# Patient Record
Sex: MALE | Race: WHITE | NOT HISPANIC OR LATINO | Employment: FULL TIME | URBAN - METROPOLITAN AREA
[De-identification: names, ages, dates, MRNs, and addresses within clinical notes are randomized per-mention and may not be internally consistent; named-entity substitution may affect disease eponyms.]

---

## 2017-10-10 ENCOUNTER — HISTORICAL (OUTPATIENT)
Dept: ADMINISTRATIVE | Facility: HOSPITAL | Age: 47
End: 2017-10-10

## 2017-10-12 ENCOUNTER — HISTORICAL (OUTPATIENT)
Dept: RADIOLOGY | Facility: HOSPITAL | Age: 47
End: 2017-10-12

## 2021-01-22 ENCOUNTER — HISTORICAL (OUTPATIENT)
Dept: LAB | Facility: HOSPITAL | Age: 51
End: 2021-01-22

## 2021-01-22 LAB
ABS NEUT (OLG): 5.2 X10(3)/MCL (ref 1.5–6.9)
ALBUMIN SERPL-MCNC: 4.1 GM/DL (ref 3.5–5)
ALBUMIN/GLOB SERPL: 1.3 RATIO (ref 1.1–2)
ALP SERPL-CCNC: 51 UNIT/L (ref 40–150)
ALT SERPL-CCNC: 61 UNIT/L (ref 0–55)
APPEARANCE, UA: CLEAR
AST SERPL-CCNC: 38 UNIT/L (ref 5–34)
BASOPHILS # BLD AUTO: 0.1 X10(3)/MCL (ref 0–0.1)
BASOPHILS NFR BLD AUTO: 1 % (ref 0–1)
BILIRUB SERPL-MCNC: 0.5 MG/DL
BILIRUB UR QL STRIP: NEGATIVE
BILIRUBIN DIRECT+TOT PNL SERPL-MCNC: 0.2 MG/DL (ref 0–0.5)
BILIRUBIN DIRECT+TOT PNL SERPL-MCNC: 0.3 MG/DL (ref 0–0.8)
BUN SERPL-MCNC: 11 MG/DL (ref 8.4–25.7)
CALCIUM SERPL-MCNC: 9 MG/DL (ref 8.4–10.2)
CHLORIDE SERPL-SCNC: 101 MMOL/L (ref 98–107)
CHOLEST SERPL-MCNC: 228 MG/DL
CHOLEST/HDLC SERPL: 6 {RATIO} (ref 0–5)
CO2 SERPL-SCNC: 27 MMOL/L (ref 22–29)
COLOR UR: YELLOW
CREAT SERPL-MCNC: 0.89 MG/DL (ref 0.73–1.18)
EOSINOPHIL # BLD AUTO: 0.1 X10(3)/MCL (ref 0–0.6)
EOSINOPHIL NFR BLD AUTO: 2 % (ref 0–5)
ERYTHROCYTE [DISTWIDTH] IN BLOOD BY AUTOMATED COUNT: 12.4 % (ref 11.5–17)
EST. AVERAGE GLUCOSE BLD GHB EST-MCNC: 134.1 MG/DL
GLOBULIN SER-MCNC: 3.1 GM/DL (ref 2.4–3.5)
GLUCOSE (UA): NEGATIVE MG/DL
GLUCOSE SERPL-MCNC: 142 MG/DL (ref 74–100)
HBA1C MFR BLD: 6.3 %
HCT VFR BLD AUTO: 43.7 % (ref 42–52)
HCV AB SERPL QL IA: NONREACTIVE
HDLC SERPL-MCNC: 41 MG/DL (ref 35–60)
HGB BLD-MCNC: 14.9 GM/DL (ref 14–18)
HGB UR QL STRIP: NEGATIVE
IMM GRANULOCYTES # BLD AUTO: 0.05 10*3/UL (ref 0–0.02)
IMM GRANULOCYTES NFR BLD AUTO: 0.6 % (ref 0–0.43)
KETONES UR QL STRIP: NEGATIVE MG/DL
LDLC SERPL CALC-MCNC: 143 MG/DL (ref 50–140)
LEUKOCYTE ESTERASE UR QL STRIP: NEGATIVE
LYMPHOCYTES # BLD AUTO: 1.3 X10(3)/MCL (ref 0.5–4.1)
LYMPHOCYTES NFR BLD AUTO: 17 % (ref 15–40)
MCH RBC QN AUTO: 31 PG (ref 27–34)
MCHC RBC AUTO-ENTMCNC: 34 GM/DL (ref 31–36)
MCV RBC AUTO: 92 FL (ref 80–99)
MONOCYTES # BLD AUTO: 1.1 X10(3)/MCL (ref 0–1.1)
MONOCYTES NFR BLD AUTO: 14 % (ref 4–12)
NEUTROPHILS # BLD AUTO: 5.2 X10(3)/MCL (ref 1.5–6.9)
NEUTROPHILS NFR BLD AUTO: 66 % (ref 43–75)
NITRITE UR QL STRIP: NEGATIVE
PH UR STRIP: 6.5 [PH] (ref 4.6–8)
PLATELET # BLD AUTO: 235 X10(3)/MCL (ref 140–400)
PMV BLD AUTO: 9.4 FL (ref 6.8–10)
POTASSIUM SERPL-SCNC: 4.1 MMOL/L (ref 3.5–5.1)
PROT SERPL-MCNC: 7.2 GM/DL (ref 6.4–8.3)
PROT UR QL STRIP: NEGATIVE MG/DL
PSA SERPL-MCNC: 1.62 NG/ML
RBC # BLD AUTO: 4.76 X10(6)/MCL (ref 4.7–6.1)
SODIUM SERPL-SCNC: 136 MMOL/L (ref 136–145)
SP GR UR STRIP: 1.02 (ref 1–1.03)
TRIGL SERPL-MCNC: 221 MG/DL (ref 34–140)
TSH SERPL-ACNC: 1.36 UIU/ML (ref 0.35–4.94)
URATE SERPL-MCNC: 7.2 MG/DL (ref 3.5–7.2)
UROBILINOGEN UR STRIP-ACNC: 0.2 EU/DL
VLDLC SERPL CALC-MCNC: 44 MG/DL
WBC # SPEC AUTO: 7.9 X10(3)/MCL (ref 4.5–11.5)

## 2021-02-10 LAB — CRC RECOMMENDATION EXT: NORMAL

## 2021-08-10 ENCOUNTER — HISTORICAL (OUTPATIENT)
Dept: ADMINISTRATIVE | Facility: HOSPITAL | Age: 51
End: 2021-08-10

## 2022-02-07 ENCOUNTER — HISTORICAL (OUTPATIENT)
Dept: ADMINISTRATIVE | Facility: HOSPITAL | Age: 52
End: 2022-02-07

## 2022-02-07 LAB
ABS NEUT (OLG): 5.98 (ref 2.1–9.2)
ALBUMIN SERPL-MCNC: 4.6 G/DL (ref 3.5–5)
ALBUMIN/GLOB SERPL: 1.8 {RATIO} (ref 1.1–2)
ALP SERPL-CCNC: 47 U/L (ref 40–150)
ALT SERPL-CCNC: 26 U/L (ref 0–55)
APPEARANCE, UA: CLEAR
AST SERPL-CCNC: 20 U/L (ref 5–34)
BACTERIA SPEC CULT: NORMAL
BASOPHILS # BLD AUTO: 0 10*3/UL (ref 0–0.2)
BASOPHILS NFR BLD AUTO: 0 %
BILIRUB SERPL-MCNC: 0.6 MG/DL
BILIRUB UR QL STRIP: NEGATIVE
BILIRUBIN DIRECT+TOT PNL SERPL-MCNC: 0.3 (ref 0–0.5)
BILIRUBIN DIRECT+TOT PNL SERPL-MCNC: 0.3 (ref 0–0.8)
BUDDING YEAST: NORMAL
BUN SERPL-MCNC: 9 MG/DL (ref 8.4–25.7)
CALCIUM SERPL-MCNC: 10 MG/DL (ref 8.7–10.5)
CASTS, UA: NORMAL
CHLORIDE SERPL-SCNC: 105 MMOL/L (ref 98–107)
CHOLEST SERPL-MCNC: 219 MG/DL
CHOLEST/HDLC SERPL: 4 {RATIO} (ref 0–5)
CO2 SERPL-SCNC: 27 MMOL/L (ref 22–29)
COLOR UR: YELLOW
CREAT SERPL-MCNC: 0.79 MG/DL (ref 0.73–1.18)
CRYSTALS: NORMAL
EOSINOPHIL # BLD AUTO: 0 10*3/UL (ref 0–0.9)
EOSINOPHIL NFR BLD AUTO: 1 %
ERYTHROCYTE [DISTWIDTH] IN BLOOD BY AUTOMATED COUNT: 12.9 % (ref 11.5–17)
EST. AVERAGE GLUCOSE BLD GHB EST-MCNC: 108.3 MG/DL
GLOBULIN SER-MCNC: 2.6 G/DL (ref 2.4–3.5)
GLUCOSE (UA): NEGATIVE
GLUCOSE SERPL-MCNC: 110 MG/DL (ref 74–100)
HBA1C MFR BLD: 5.4 %
HCT VFR BLD AUTO: 48 % (ref 42–52)
HDLC SERPL-MCNC: 60 MG/DL (ref 35–60)
HEMOLYSIS INTERF INDEX SERPL-ACNC: 12
HGB BLD-MCNC: 15.1 G/DL (ref 14–18)
HGB UR QL STRIP: NEGATIVE
ICTERIC INTERF INDEX SERPL-ACNC: 1
KETONES UR QL STRIP: NEGATIVE
LDLC SERPL CALC-MCNC: 146 MG/DL (ref 50–140)
LEUKOCYTE ESTERASE UR QL STRIP: NORMAL
LIPEMIC INTERF INDEX SERPL-ACNC: 13
LYMPHOCYTES # BLD AUTO: 1.4 10*3/UL (ref 0.6–4.6)
LYMPHOCYTES NFR BLD AUTO: 18 %
MANUAL DIFF? (OHS): NO
MCH RBC QN AUTO: 31.9 PG (ref 27–31)
MCHC RBC AUTO-ENTMCNC: 31.5 G/DL (ref 33–36)
MCV RBC AUTO: 101.5 FL (ref 80–94)
MONOCYTES # BLD AUTO: 0.6 10*3/UL (ref 0.1–1.3)
MONOCYTES NFR BLD AUTO: 7 %
NEUTROPHILS # BLD AUTO: 5.98 10*3/UL (ref 2.1–9.2)
NEUTROPHILS NFR BLD AUTO: 73 %
NITRITE UR QL STRIP: NEGATIVE
PH UR STRIP: 7 [PH] (ref 5–9)
PLATELET # BLD AUTO: 221 10*3/UL (ref 130–400)
PMV BLD AUTO: 10 FL (ref 9.4–12.4)
POTASSIUM SERPL-SCNC: 4.5 MMOL/L (ref 3.5–5.1)
PROT SERPL-MCNC: 7.2 G/DL (ref 6.4–8.3)
PROT UR QL STRIP: NEGATIVE
PSA SERPL-MCNC: 2.07 NG/ML
RBC # BLD AUTO: 4.73 10*6/UL (ref 4.7–6.1)
RBC #/AREA URNS HPF: NORMAL /[HPF] (ref 0–2)
SMALL ROUND CELLS, UA: NORMAL
SODIUM SERPL-SCNC: 144 MMOL/L (ref 136–145)
SP GR UR STRIP: 1 (ref 1–1.03)
SPERM URNS QL MICRO: NORMAL
SQUAMOUS EPITHELIAL, UA: NORMAL (ref 0–4)
TRIGL SERPL-MCNC: 66 MG/DL (ref 34–140)
TSH SERPL-ACNC: 0.76 M[IU]/L (ref 0.35–4.94)
UROBILINOGEN UR STRIP-ACNC: 0.2
VLDLC SERPL CALC-MCNC: 13 MG/DL
WBC # SPEC AUTO: 8.2 10*3/UL (ref 4.5–11.5)
WBC #/AREA URNS HPF: NORMAL /[HPF] (ref 0–2)

## 2022-04-09 ENCOUNTER — HISTORICAL (OUTPATIENT)
Dept: ADMINISTRATIVE | Facility: HOSPITAL | Age: 52
End: 2022-04-09
Payer: COMMERCIAL

## 2022-04-27 VITALS
BODY MASS INDEX: 35.82 KG/M2 | SYSTOLIC BLOOD PRESSURE: 166 MMHG | DIASTOLIC BLOOD PRESSURE: 84 MMHG | WEIGHT: 241.88 LBS | HEIGHT: 69 IN

## 2022-09-05 NOTE — PROGRESS NOTES
Patient ID: [id number]     Chief Complaint: Follow Up        HPI:     Javi Rain is a 52 y.o. male here today for a HTN follow up. No other complaints today. Feels great. Denies of SOB, CP,HA, Lower ext swelling or dizziness. No new complaints. Compliant with Losartan and Allopurinol daily. BP mildly elevated as he reports of using smokeless tobacco. Repeat BP at goal.     ----------------------------  Gout  Hyperlipidemia  Hypertension  Hypertension  Hyperuricemia  Obesity  Osteoarthritis of left hip  Prediabetes     History reviewed. No pertinent surgical history.    Review of patient's allergies indicates:  No Known Allergies    Outpatient Medications Marked as Taking for the 9/7/22 encounter (Office Visit) with Erica Adams MD   Medication Sig Dispense Refill    allopurinoL (ZYLOPRIM) 100 MG tablet Take 100 mg by mouth once daily.      [DISCONTINUED] losartan (COZAAR) 25 MG tablet Take 25 mg by mouth once daily.         Social History     Socioeconomic History    Marital status:    Tobacco Use    Smoking status: Every Day    Tobacco comments:     Smokless   Substance and Sexual Activity    Alcohol use: Yes     Alcohol/week: 14.0 standard drinks     Types: 14 Cans of beer per week    Drug use: Never    Sexual activity: Yes     Partners: Female     Birth control/protection: None        Family History   Problem Relation Age of Onset    Arthritis Mother     Heart disease Maternal Grandfather     Diabetes Maternal Grandmother     Diabetes Paternal Grandmother         Patient Care Team:  Erica Adams MD as PCP - General (Family Medicine)     Subjective:     ROS    See HPI for details    Constitutional: Denies Change in appetite. Denies Chills. Denies Fever. Denies Night sweats.  Eye: Denies Blurred vision. Denies Discharge. Denies Eye pain.  ENT: Denies Decreased hearing. Denies Sore throat. Denies Swollen glands.  Respiratory: Denies Cough. Denies Shortness of breath. Denies Shortness of breath  "with exertion. Denies Wheezing.  Cardiovascular: DeniesChest pain at rest. Denies Chest pain with exertion. Denies Irregular heartbeat. Denies Palpitations. Denies Edema.  Gastrointestinal: Denies Abdominal pain. DeniesDiarrhea. Denies Nausea. Denies Vomiting. Denies Hematemesis or Hematochezia.  Genitourinary: Denies Dysuria. Denies Urinary frequency. Denies Urinary urgency. Denies Blood in urine.  Endocrine: Denies Cold intolerance. Denies Excessive thirst. Denies Heat intolerance. Denies Weight loss. Denies Weight gain.  Musculoskeletal: Denies Painful joints. Denies Weakness.  Integumentary: Denies Rash. Denies Itching. Denies Dry skin.  Neurologic: Denies Dizziness. Denies Fainting. Denies Headache.  Psychiatric: Denies Depression. Denies Anxiety. Denies Suicidal/Homicidal ideations.    All Other ROS: Negative except as stated in HPI.       Objective:     BP (!) 142/89 (BP Location: Right arm, Patient Position: Sitting, BP Method: Large (Manual))   Pulse 65   Temp 97.9 °F (36.6 °C) (Oral)   Resp 17   Ht 5' 8" (1.727 m)   Wt 86.7 kg (191 lb 3.2 oz)   SpO2 99%   BMI 29.07 kg/m²     Physical Exam    General: Alert and oriented, No acute distress.  Head: Normocephalic, Atraumatic.  Eye: Pupils are equal, round and reactive to light, Extraocular movements are intact, Sclera non-icteric.  Ears/Nose/Throat: Normal, Mucosa moist,Clear.  Neck/Thyroid: Supple, Non-tender, No carotid bruit, No palpable thyromegaly or thyroid nodule, No lymphadenopathy, No JVD, Full range of motion.  Respiratory: Clear to auscultation bilaterally; No wheezes, rales or rhonchi,Non-labored respirations, Symmetrical chest wall expansion.  Cardiovascular: Regular rate and rhythm, S1/S2 normal, No murmurs, rubs or gallops.      Assessment:       ICD-10-CM ICD-9-CM   1. Primary hypertension  I10 401.9   2. Hyperuricemia  E79.0 790.6        Plan:     1. Primary hypertension  BP elevated today in clinic. Repeat BP was at goal  Recommend to " change the BP cuff and be seen in for nurse visit  Continue current regimen  Low Sodium Diet (DASH Diet - Less than 2 grams of sodium per day).  Monitor blood pressure daily and log. Report consistent numbers greater than 140/90.  Maintain healthy weight with goal BMI <30. Exercise 30 minutes per day, 5 days per week.  Recommend to stop using tobacco products   The 10-year ASCVD risk score (Jin ALEGRIA, et al., 2019) is: 11.4%*    Values used to calculate the score:      Age: 52 years      Sex: Male      Is Non- : No      Diabetic: No      Tobacco smoker: Yes      Systolic Blood Pressure: 142 mmHg      Is BP treated: Yes      HDL Cholesterol: 60 mg/dL*      Total Cholesterol: 219 mg/dL*      * - Cholesterol units were assumed for this score calculation   Declines addition of statin therapy at this time.     2. Hyperuricemia  Continue current regimen  No new gout attacks  Stay well hydrated by increasing water intake throughout the day.  Stressed importance of exercise and weight loss to maintain BMI <30.  Avoid alcohol, sodas, organ/glandular meats (liver, kidney, sweetbreads). Limit seafood and red meat intake.  Eat a balanced diet of fruits, vegetables, complex carbohydrates, and lean sources of protein (boneless/skinless chicken breasts, salmon, lentils, low fat dairy).  Discussed possible benefit of OTC Vitamin C supplementation.       Follow up in about 6 months (around 3/7/2023) for Annual Wellness. In addition to their scheduled follow up, the patient has also been instructed to follow up on as needed basis.

## 2022-09-07 ENCOUNTER — OFFICE VISIT (OUTPATIENT)
Dept: FAMILY MEDICINE | Facility: CLINIC | Age: 52
End: 2022-09-07
Payer: COMMERCIAL

## 2022-09-07 VITALS
RESPIRATION RATE: 17 BRPM | HEIGHT: 68 IN | SYSTOLIC BLOOD PRESSURE: 142 MMHG | TEMPERATURE: 98 F | BODY MASS INDEX: 28.98 KG/M2 | WEIGHT: 191.19 LBS | DIASTOLIC BLOOD PRESSURE: 89 MMHG | OXYGEN SATURATION: 99 % | HEART RATE: 65 BPM

## 2022-09-07 DIAGNOSIS — I10 PRIMARY HYPERTENSION: Primary | Chronic | ICD-10-CM

## 2022-09-07 DIAGNOSIS — E79.0 HYPERURICEMIA: Chronic | ICD-10-CM

## 2022-09-07 PROBLEM — E78.5 HYPERLIPIDEMIA: Status: ACTIVE | Noted: 2022-09-07

## 2022-09-07 PROBLEM — M16.12 OSTEOARTHRITIS OF LEFT HIP: Chronic | Status: ACTIVE | Noted: 2022-09-07

## 2022-09-07 PROBLEM — R73.03 PREDIABETES: Status: ACTIVE | Noted: 2022-09-07

## 2022-09-07 PROBLEM — R73.03 PREDIABETES: Chronic | Status: ACTIVE | Noted: 2022-09-07

## 2022-09-07 PROBLEM — M10.9 GOUT: Status: ACTIVE | Noted: 2022-09-07

## 2022-09-07 PROBLEM — E66.9 OBESITY: Chronic | Status: ACTIVE | Noted: 2022-09-07

## 2022-09-07 PROBLEM — M10.9 GOUT: Chronic | Status: ACTIVE | Noted: 2022-09-07

## 2022-09-07 PROBLEM — E78.5 HYPERLIPIDEMIA: Chronic | Status: ACTIVE | Noted: 2022-09-07

## 2022-09-07 PROBLEM — E66.9 OBESITY: Status: ACTIVE | Noted: 2022-09-07

## 2022-09-07 PROBLEM — M16.12 OSTEOARTHRITIS OF LEFT HIP: Status: ACTIVE | Noted: 2022-09-07

## 2022-09-07 PROCEDURE — 3077F SYST BP >= 140 MM HG: CPT | Mod: CPTII,,, | Performed by: STUDENT IN AN ORGANIZED HEALTH CARE EDUCATION/TRAINING PROGRAM

## 2022-09-07 PROCEDURE — 3008F PR BODY MASS INDEX (BMI) DOCUMENTED: ICD-10-PCS | Mod: CPTII,,, | Performed by: STUDENT IN AN ORGANIZED HEALTH CARE EDUCATION/TRAINING PROGRAM

## 2022-09-07 PROCEDURE — 99213 PR OFFICE/OUTPT VISIT, EST, LEVL III, 20-29 MIN: ICD-10-PCS | Mod: ,,, | Performed by: STUDENT IN AN ORGANIZED HEALTH CARE EDUCATION/TRAINING PROGRAM

## 2022-09-07 PROCEDURE — 99213 OFFICE O/P EST LOW 20 MIN: CPT | Mod: ,,, | Performed by: STUDENT IN AN ORGANIZED HEALTH CARE EDUCATION/TRAINING PROGRAM

## 2022-09-07 PROCEDURE — 3077F PR MOST RECENT SYSTOLIC BLOOD PRESSURE >= 140 MM HG: ICD-10-PCS | Mod: CPTII,,, | Performed by: STUDENT IN AN ORGANIZED HEALTH CARE EDUCATION/TRAINING PROGRAM

## 2022-09-07 PROCEDURE — 4010F PR ACE/ARB THEARPY RXD/TAKEN: ICD-10-PCS | Mod: CPTII,,, | Performed by: STUDENT IN AN ORGANIZED HEALTH CARE EDUCATION/TRAINING PROGRAM

## 2022-09-07 PROCEDURE — 3008F BODY MASS INDEX DOCD: CPT | Mod: CPTII,,, | Performed by: STUDENT IN AN ORGANIZED HEALTH CARE EDUCATION/TRAINING PROGRAM

## 2022-09-07 PROCEDURE — 4010F ACE/ARB THERAPY RXD/TAKEN: CPT | Mod: CPTII,,, | Performed by: STUDENT IN AN ORGANIZED HEALTH CARE EDUCATION/TRAINING PROGRAM

## 2022-09-07 PROCEDURE — 1160F RVW MEDS BY RX/DR IN RCRD: CPT | Mod: CPTII,,, | Performed by: STUDENT IN AN ORGANIZED HEALTH CARE EDUCATION/TRAINING PROGRAM

## 2022-09-07 PROCEDURE — 3079F DIAST BP 80-89 MM HG: CPT | Mod: CPTII,,, | Performed by: STUDENT IN AN ORGANIZED HEALTH CARE EDUCATION/TRAINING PROGRAM

## 2022-09-07 PROCEDURE — 1160F PR REVIEW ALL MEDS BY PRESCRIBER/CLIN PHARMACIST DOCUMENTED: ICD-10-PCS | Mod: CPTII,,, | Performed by: STUDENT IN AN ORGANIZED HEALTH CARE EDUCATION/TRAINING PROGRAM

## 2022-09-07 PROCEDURE — 1159F PR MEDICATION LIST DOCUMENTED IN MEDICAL RECORD: ICD-10-PCS | Mod: CPTII,,, | Performed by: STUDENT IN AN ORGANIZED HEALTH CARE EDUCATION/TRAINING PROGRAM

## 2022-09-07 PROCEDURE — 1159F MED LIST DOCD IN RCRD: CPT | Mod: CPTII,,, | Performed by: STUDENT IN AN ORGANIZED HEALTH CARE EDUCATION/TRAINING PROGRAM

## 2022-09-07 PROCEDURE — 3079F PR MOST RECENT DIASTOLIC BLOOD PRESSURE 80-89 MM HG: ICD-10-PCS | Mod: CPTII,,, | Performed by: STUDENT IN AN ORGANIZED HEALTH CARE EDUCATION/TRAINING PROGRAM

## 2022-09-07 RX ORDER — LOSARTAN POTASSIUM 25 MG/1
25 TABLET ORAL DAILY
Qty: 90 TABLET | Refills: 3 | Status: SHIPPED | OUTPATIENT
Start: 2022-09-07 | End: 2023-09-05 | Stop reason: SDUPTHER

## 2022-09-07 RX ORDER — LOSARTAN POTASSIUM 25 MG/1
25 TABLET ORAL DAILY
COMMUNITY
Start: 2022-02-03 | End: 2022-09-07 | Stop reason: SDUPTHER

## 2022-09-07 RX ORDER — ALLOPURINOL 100 MG/1
100 TABLET ORAL DAILY
COMMUNITY
Start: 2022-08-11 | End: 2023-05-02 | Stop reason: SDUPTHER

## 2023-04-10 RX ORDER — ALLOPURINOL 100 MG/1
100 TABLET ORAL DAILY
Qty: 90 TABLET | Refills: 3 | OUTPATIENT
Start: 2023-04-10

## 2023-04-10 NOTE — TELEPHONE ENCOUNTER
----- Message from Ayanna Bains sent at 4/10/2023  4:04 PM CDT -----  Regarding: Med refill  [facility] @ [address] [facility]  is requesting a refill on Allopurinol 100mg tablets. Qty.90. Take 2 tablet by mouth daily.

## 2023-04-12 ENCOUNTER — OFFICE VISIT (OUTPATIENT)
Dept: FAMILY MEDICINE | Facility: CLINIC | Age: 53
End: 2023-04-12
Payer: COMMERCIAL

## 2023-04-12 ENCOUNTER — LAB VISIT (OUTPATIENT)
Dept: LAB | Facility: HOSPITAL | Age: 53
End: 2023-04-12
Attending: STUDENT IN AN ORGANIZED HEALTH CARE EDUCATION/TRAINING PROGRAM
Payer: COMMERCIAL

## 2023-04-12 VITALS
HEIGHT: 68 IN | TEMPERATURE: 98 F | SYSTOLIC BLOOD PRESSURE: 138 MMHG | HEART RATE: 73 BPM | DIASTOLIC BLOOD PRESSURE: 88 MMHG | RESPIRATION RATE: 16 BRPM | OXYGEN SATURATION: 99 % | WEIGHT: 197.88 LBS | BODY MASS INDEX: 29.99 KG/M2

## 2023-04-12 DIAGNOSIS — R73.03 PREDIABETES: Chronic | ICD-10-CM

## 2023-04-12 DIAGNOSIS — E78.2 MIXED HYPERLIPIDEMIA: Chronic | ICD-10-CM

## 2023-04-12 DIAGNOSIS — E79.0 HYPERURICEMIA: Chronic | ICD-10-CM

## 2023-04-12 DIAGNOSIS — Z11.4 ENCOUNTER FOR SCREENING FOR HIV: ICD-10-CM

## 2023-04-12 DIAGNOSIS — L30.9 ECZEMA, UNSPECIFIED TYPE: ICD-10-CM

## 2023-04-12 DIAGNOSIS — Z12.5 ENCOUNTER FOR SCREENING FOR MALIGNANT NEOPLASM OF PROSTATE: ICD-10-CM

## 2023-04-12 DIAGNOSIS — Z72.0 SMOKELESS TOBACCO USE: ICD-10-CM

## 2023-04-12 DIAGNOSIS — Z00.00 WELLNESS EXAMINATION: Primary | ICD-10-CM

## 2023-04-12 DIAGNOSIS — Z23 NEED FOR TETANUS, DIPHTHERIA, AND ACELLULAR PERTUSSIS (TDAP) VACCINE: ICD-10-CM

## 2023-04-12 DIAGNOSIS — I10 PRIMARY HYPERTENSION: ICD-10-CM

## 2023-04-12 DIAGNOSIS — Z23 NEED FOR SHINGLES VACCINE: ICD-10-CM

## 2023-04-12 PROBLEM — M10.9 GOUT: Chronic | Status: RESOLVED | Noted: 2022-09-07 | Resolved: 2023-04-12

## 2023-04-12 LAB
ALBUMIN SERPL-MCNC: 4.2 G/DL (ref 3.5–5)
ALBUMIN/GLOB SERPL: 1.8 RATIO (ref 1.1–2)
ALP SERPL-CCNC: 39 UNIT/L (ref 40–150)
ALT SERPL-CCNC: 23 UNIT/L (ref 0–55)
APPEARANCE UR: CLEAR
AST SERPL-CCNC: 22 UNIT/L (ref 5–34)
BACTERIA #/AREA URNS AUTO: NORMAL /HPF
BASOPHILS # BLD AUTO: 0.05 X10(3)/MCL (ref 0–0.2)
BASOPHILS NFR BLD AUTO: 0.6 %
BILIRUB UR QL STRIP.AUTO: NEGATIVE MG/DL
BILIRUBIN DIRECT+TOT PNL SERPL-MCNC: 0.5 MG/DL
BUN SERPL-MCNC: 10.9 MG/DL (ref 8.4–25.7)
CALCIUM SERPL-MCNC: 9.2 MG/DL (ref 8.4–10.2)
CHLORIDE SERPL-SCNC: 110 MMOL/L (ref 98–107)
CHOLEST SERPL-MCNC: 212 MG/DL
CHOLEST/HDLC SERPL: 3 {RATIO} (ref 0–5)
CO2 SERPL-SCNC: 27 MMOL/L (ref 22–29)
COLOR UR AUTO: YELLOW
CREAT SERPL-MCNC: 0.9 MG/DL (ref 0.73–1.18)
CREAT UR-MCNC: 35.4 MG/DL (ref 63–166)
EOSINOPHIL # BLD AUTO: 0.09 X10(3)/MCL (ref 0–0.9)
EOSINOPHIL NFR BLD AUTO: 1 %
ERYTHROCYTE [DISTWIDTH] IN BLOOD BY AUTOMATED COUNT: 13.2 % (ref 11.5–17)
EST. AVERAGE GLUCOSE BLD GHB EST-MCNC: 102.5 MG/DL
GFR SERPLBLD CREATININE-BSD FMLA CKD-EPI: >60 MLS/MIN/1.73/M2
GLOBULIN SER-MCNC: 2.4 GM/DL (ref 2.4–3.5)
GLUCOSE SERPL-MCNC: 96 MG/DL (ref 74–100)
GLUCOSE UR QL STRIP.AUTO: NEGATIVE MG/DL
HBA1C MFR BLD: 5.2 %
HCT VFR BLD AUTO: 41.8 % (ref 42–52)
HDLC SERPL-MCNC: 74 MG/DL (ref 35–60)
HGB BLD-MCNC: 13.7 G/DL (ref 14–18)
HIV 1+2 AB+HIV1 P24 AG SERPL QL IA: NONREACTIVE
IMM GRANULOCYTES # BLD AUTO: 0.04 X10(3)/MCL (ref 0–0.04)
IMM GRANULOCYTES NFR BLD AUTO: 0.5 %
KETONES UR QL STRIP.AUTO: NEGATIVE MG/DL
LDLC SERPL CALC-MCNC: 128 MG/DL (ref 50–140)
LEUKOCYTE ESTERASE UR QL STRIP.AUTO: ABNORMAL UNIT/L
LYMPHOCYTES # BLD AUTO: 1.47 X10(3)/MCL (ref 0.6–4.6)
LYMPHOCYTES NFR BLD AUTO: 17.1 %
MCH RBC QN AUTO: 32.2 PG (ref 27–31)
MCHC RBC AUTO-ENTMCNC: 32.8 G/DL (ref 33–36)
MCV RBC AUTO: 98.4 FL (ref 80–94)
MICROALBUMIN UR-MCNC: <5 UG/ML
MICROALBUMIN/CREAT RATIO PNL UR: <14.1 MG/GM CR (ref 0–30)
MONOCYTES # BLD AUTO: 0.67 X10(3)/MCL (ref 0.1–1.3)
MONOCYTES NFR BLD AUTO: 7.8 %
NEUTROPHILS # BLD AUTO: 6.27 X10(3)/MCL (ref 2.1–9.2)
NEUTROPHILS NFR BLD AUTO: 73 %
NITRITE UR QL STRIP.AUTO: NEGATIVE
NRBC BLD AUTO-RTO: 0 %
PH UR STRIP.AUTO: 6 [PH]
PLATELET # BLD AUTO: 229 X10(3)/MCL (ref 130–400)
PMV BLD AUTO: 9.5 FL (ref 7.4–10.4)
POTASSIUM SERPL-SCNC: 4.2 MMOL/L (ref 3.5–5.1)
PROT SERPL-MCNC: 6.6 GM/DL (ref 6.4–8.3)
PROT UR QL STRIP.AUTO: NEGATIVE MG/DL
PSA SERPL-MCNC: 2.83 NG/ML
RBC # BLD AUTO: 4.25 X10(6)/MCL (ref 4.7–6.1)
RBC #/AREA URNS AUTO: <5 /HPF
RBC UR QL AUTO: NEGATIVE UNIT/L
SODIUM SERPL-SCNC: 141 MMOL/L (ref 136–145)
SP GR UR STRIP.AUTO: 1.01 (ref 1–1.03)
SQUAMOUS #/AREA URNS AUTO: <5 /HPF
TRIGL SERPL-MCNC: 49 MG/DL (ref 34–140)
TSH SERPL-ACNC: 0.72 UIU/ML (ref 0.35–4.94)
URATE SERPL-MCNC: 6.4 MG/DL (ref 3.5–7.2)
UROBILINOGEN UR STRIP-ACNC: 0.2 MG/DL
VLDLC SERPL CALC-MCNC: 10 MG/DL
WBC # SPEC AUTO: 8.6 X10(3)/MCL (ref 4.5–11.5)
WBC #/AREA URNS AUTO: <5 /HPF

## 2023-04-12 PROCEDURE — 36415 COLL VENOUS BLD VENIPUNCTURE: CPT

## 2023-04-12 PROCEDURE — 3075F SYST BP GE 130 - 139MM HG: CPT | Mod: CPTII,,, | Performed by: STUDENT IN AN ORGANIZED HEALTH CARE EDUCATION/TRAINING PROGRAM

## 2023-04-12 PROCEDURE — 82043 UR ALBUMIN QUANTITATIVE: CPT

## 2023-04-12 PROCEDURE — 99406 PR TOBACCO USE CESSATION INTERMEDIATE 3-10 MINUTES: ICD-10-PCS | Mod: ,,, | Performed by: STUDENT IN AN ORGANIZED HEALTH CARE EDUCATION/TRAINING PROGRAM

## 2023-04-12 PROCEDURE — 84550 ASSAY OF BLOOD/URIC ACID: CPT

## 2023-04-12 PROCEDURE — 83036 HEMOGLOBIN GLYCOSYLATED A1C: CPT

## 2023-04-12 PROCEDURE — 3079F DIAST BP 80-89 MM HG: CPT | Mod: CPTII,,, | Performed by: STUDENT IN AN ORGANIZED HEALTH CARE EDUCATION/TRAINING PROGRAM

## 2023-04-12 PROCEDURE — 84153 ASSAY OF PSA TOTAL: CPT

## 2023-04-12 PROCEDURE — 99406 BEHAV CHNG SMOKING 3-10 MIN: CPT | Mod: ,,, | Performed by: STUDENT IN AN ORGANIZED HEALTH CARE EDUCATION/TRAINING PROGRAM

## 2023-04-12 PROCEDURE — 84443 ASSAY THYROID STIM HORMONE: CPT

## 2023-04-12 PROCEDURE — 4010F PR ACE/ARB THEARPY RXD/TAKEN: ICD-10-PCS | Mod: CPTII,,, | Performed by: STUDENT IN AN ORGANIZED HEALTH CARE EDUCATION/TRAINING PROGRAM

## 2023-04-12 PROCEDURE — 99396 PREV VISIT EST AGE 40-64: CPT | Mod: 25,,, | Performed by: STUDENT IN AN ORGANIZED HEALTH CARE EDUCATION/TRAINING PROGRAM

## 2023-04-12 PROCEDURE — 3066F PR DOCUMENTATION OF TREATMENT FOR NEPHROPATHY: ICD-10-PCS | Mod: CPTII,,, | Performed by: STUDENT IN AN ORGANIZED HEALTH CARE EDUCATION/TRAINING PROGRAM

## 2023-04-12 PROCEDURE — 3066F NEPHROPATHY DOC TX: CPT | Mod: CPTII,,, | Performed by: STUDENT IN AN ORGANIZED HEALTH CARE EDUCATION/TRAINING PROGRAM

## 2023-04-12 PROCEDURE — 1160F RVW MEDS BY RX/DR IN RCRD: CPT | Mod: CPTII,,, | Performed by: STUDENT IN AN ORGANIZED HEALTH CARE EDUCATION/TRAINING PROGRAM

## 2023-04-12 PROCEDURE — 80061 LIPID PANEL: CPT

## 2023-04-12 PROCEDURE — 4010F ACE/ARB THERAPY RXD/TAKEN: CPT | Mod: CPTII,,, | Performed by: STUDENT IN AN ORGANIZED HEALTH CARE EDUCATION/TRAINING PROGRAM

## 2023-04-12 PROCEDURE — 1159F MED LIST DOCD IN RCRD: CPT | Mod: CPTII,,, | Performed by: STUDENT IN AN ORGANIZED HEALTH CARE EDUCATION/TRAINING PROGRAM

## 2023-04-12 PROCEDURE — 80053 COMPREHEN METABOLIC PANEL: CPT

## 2023-04-12 PROCEDURE — 99396 PR PREVENTIVE VISIT,EST,40-64: ICD-10-PCS | Mod: 25,,, | Performed by: STUDENT IN AN ORGANIZED HEALTH CARE EDUCATION/TRAINING PROGRAM

## 2023-04-12 PROCEDURE — 3079F PR MOST RECENT DIASTOLIC BLOOD PRESSURE 80-89 MM HG: ICD-10-PCS | Mod: CPTII,,, | Performed by: STUDENT IN AN ORGANIZED HEALTH CARE EDUCATION/TRAINING PROGRAM

## 2023-04-12 PROCEDURE — 3075F PR MOST RECENT SYSTOLIC BLOOD PRESS GE 130-139MM HG: ICD-10-PCS | Mod: CPTII,,, | Performed by: STUDENT IN AN ORGANIZED HEALTH CARE EDUCATION/TRAINING PROGRAM

## 2023-04-12 PROCEDURE — 87389 HIV-1 AG W/HIV-1&-2 AB AG IA: CPT

## 2023-04-12 PROCEDURE — 1160F PR REVIEW ALL MEDS BY PRESCRIBER/CLIN PHARMACIST DOCUMENTED: ICD-10-PCS | Mod: CPTII,,, | Performed by: STUDENT IN AN ORGANIZED HEALTH CARE EDUCATION/TRAINING PROGRAM

## 2023-04-12 PROCEDURE — 3061F NEG MICROALBUMINURIA REV: CPT | Mod: CPTII,,, | Performed by: STUDENT IN AN ORGANIZED HEALTH CARE EDUCATION/TRAINING PROGRAM

## 2023-04-12 PROCEDURE — 1159F PR MEDICATION LIST DOCUMENTED IN MEDICAL RECORD: ICD-10-PCS | Mod: CPTII,,, | Performed by: STUDENT IN AN ORGANIZED HEALTH CARE EDUCATION/TRAINING PROGRAM

## 2023-04-12 PROCEDURE — 85025 COMPLETE CBC W/AUTO DIFF WBC: CPT

## 2023-04-12 PROCEDURE — 3061F PR NEG MICROALBUMINURIA RESULT DOCUMENTED/REVIEW: ICD-10-PCS | Mod: CPTII,,, | Performed by: STUDENT IN AN ORGANIZED HEALTH CARE EDUCATION/TRAINING PROGRAM

## 2023-04-12 RX ORDER — ZOSTER VACCINE RECOMBINANT, ADJUVANTED 50 MCG/0.5
0.5 KIT INTRAMUSCULAR ONCE
Qty: 1 EACH | Refills: 1 | Status: SHIPPED | OUTPATIENT
Start: 2023-04-12 | End: 2023-04-12

## 2023-04-12 RX ORDER — TRIAMCINOLONE ACETONIDE 1 MG/G
CREAM TOPICAL 2 TIMES DAILY
Qty: 30 G | Refills: 0 | Status: SHIPPED | OUTPATIENT
Start: 2023-04-12

## 2023-04-12 NOTE — PROGRESS NOTES
Patient ID: [id number]     Chief Complaint: Wellness        HPI:      Disclaimer:  This note is prepared using voice recognition software and as such is likely to have errors despite attempts at proofreading. Please contact me for questions.     Javi Rain is a 52 y.o. male here today for an annual wellness. Reviewed and discussed lab results. He did not have bloodwork completed prior to visit as ordered but will do so in the near future. Overall he feels well. No other complaints today.     Acute issues-  Has been having off and on skin redness and flaky skin on the galbella.       Chronic issues-  Hypertension  At Goal   On Losartan 25  Asx    Prediabetes   Hemoglobin A1c   Date Value Ref Range Status   02/07/2022 5.4 <=7.0    01/22/2021 6.3 <<=7.0 % Final   No foot problem     Hyperlipidemia  The 10-year ASCVD risk score (Jin ALEGRIA, et al., 2019) is: 5.6%*    Values used to calculate the score:      Age: 52 years      Sex: Male      Is Non- : No      Diabetic: No      Tobacco smoker: No      Systolic Blood Pressure: 142 mmHg      Is BP treated: Yes      HDL Cholesterol: 60 mg/dL*      Total Cholesterol: 219 mg/dL*      * - Cholesterol units were assumed for this score calculation   Diet controlled   Chews tobacco    Hyperuricemia  On allopurinol 100 daily  Last gout attack was on 2017     Osteoarthritis of left hip  Stable  after loosing weight intentionally     History reviewed. No pertinent surgical history.    Review of patient's allergies indicates:  No Known Allergies    Outpatient Medications Marked as Taking for the 4/12/23 encounter (Office Visit) with Erica Adams MD   Medication Sig Dispense Refill    allopurinoL (ZYLOPRIM) 100 MG tablet Take 100 mg by mouth once daily.      losartan (COZAAR) 25 MG tablet Take 1 tablet (25 mg total) by mouth once daily. 90 tablet 3       Social History     Socioeconomic History    Marital status:    Tobacco Use    Smoking status:  Former     Types: Cigarettes    Smokeless tobacco: Current     Types: Snuff    Tobacco comments:     Smokless   Substance and Sexual Activity    Alcohol use: Yes     Alcohol/week: 14.0 standard drinks     Types: 14 Cans of beer per week    Drug use: Never    Sexual activity: Yes     Partners: Female     Birth control/protection: None        Family History   Problem Relation Age of Onset    Arthritis Mother     Heart disease Maternal Grandfather     Diabetes Maternal Grandmother     Diabetes Paternal Grandmother         Patient Care Team:  Erica Adams MD as PCP - General (Family Medicine)     Subjective:     ROS    See HPI for details  Skin- as HPI  Constitutional: Denies Change in appetite. Denies Chills. Denies Fever. Denies Night sweats.  Respiratory: Denies Cough. Denies Shortness of breath. Denies Shortness of breath with exertion. Denies Wheezing.  Cardiovascular: DeniesChest pain at rest. Denies Chest pain with exertion. Denies Irregular heartbeat. Denies Palpitations. Denies Edema.  Gastrointestinal: Denies Abdominal pain. DeniesDiarrhea. Denies Nausea. Denies Vomiting. Denies Hematemesis or Hematochezia.  Genitourinary: Denies Dysuria. Denies Urinary frequency. Denies Urinary urgency. Denies Blood in urine.  Psychiatric: Denies Depression. Denies Anxiety. Denies Suicidal/Homicidal ideations.    All Other ROS: Negative except as stated in HPI.       Objective:     /78     Physical Exam    General: Alert and oriented, No acute distress.  Head: Normocephalic, Atraumatic.  Eye: Pupils are equal, round and reactive to light, Extraocular movements are intact, Sclera non-icteric.  Ears/Nose/Throat: Normal, Mucosa moist,Clear.  Neck/Thyroid: Supple, Non-tender, No carotid bruit, No palpable thyromegaly or thyroid nodule, No lymphadenopathy, No JVD, Full range of motion.  Respiratory: Clear to auscultation bilaterally; No wheezes, rales or rhonchi,Non-labored respirations, Symmetrical chest wall  expansion.  Cardiovascular: Regular rate and rhythm, S1/S2 normal, No murmurs, rubs or gallops.  Gastrointestinal: Soft, Non-tender, Non-distended, Normal bowel sounds, No palpable organomegaly.  Neurologic: No focal deficits, Cranial Nerves II-XII are grossly intact, Motor strength normal upper and lower extremities, Sensory exam intact.  Psychiatric: Normal interaction, Coherent speech, Euthymic mood, Appropriate affect       Assessment:       ICD-10-CM ICD-9-CM   1. Wellness examination  Z00.00 V70.0   2. Primary hypertension  I10 401.9   3. Mixed hyperlipidemia  E78.2 272.2   4. Prediabetes  R73.03 790.29   5. Hyperuricemia  E79.0 790.6   6. Eczema, unspecified type  L30.9 692.9   7. Encounter for screening for HIV  Z11.4 V73.89   8. Need for tetanus, diphtheria, and acellular pertussis (Tdap) vaccine  Z23 V06.1   9. Need for shingles vaccine  Z23 V04.89   10. Encounter for screening for malignant neoplasm of prostate  Z12.5 V76.44   11. Smokeless tobacco use  Z72.0 305.1        Plan:       Health Maintenance Topics with due status: Not Due       Topic Last Completion Date    Lipid Panel 02/07/2022    Colorectal Cancer Screening Not Due        1. Wellness examination  AAA Screening -  (65-75) Will order [place] AAA for Welcome to Medicare.  Lung Cancer-(50-80) Smoker/ Ex smoker- Smoked > 15 years ago  Prostate Cancer Screening (50-74)- PSA ordered. Father had Prostrate Cancer  Colon Cancer Screening(45-75) - Cologuard negative. Next due in 2024  Eye Exam - Recommend annually.   Dental Exam - Recommend biannual exams.     2. Primary hypertension  Pressure slightly elevated on arrival in clinic at goal   Recommend the patient to continue Rx as prescribed  Recommend the patient to continue dash diet along with 35 minutes of brisk walking   Also recommend the patient to quit smokeless tobacco  - CBC Auto Differential; Future  - Comprehensive Metabolic Panel; Future  - TSH; Future  - Urinalysis; Future  -  Microalbumin/Creatinine Ratio, Urine; Future  - Urinalysis  - Urinalysis, Microscopic    3. Mixed hyperlipidemia  Currently not on statins   Does not qualify statins per ASCVD score as of now  - Lipid Panel; Future    4. Prediabetes  -diet as above  - Hemoglobin A1C; Future    5. Hyperuricemia  -avoid beer/red meat and continue allopurinol  - Uric Acid; Future    6. Eczema, unspecified type  - triamcinolone acetonide 0.1% (KENALOG) 0.1 % cream; Apply topically 2 (two) times daily.  Dispense: 30 g; Refill: 0  -start over-the-counter Nizoral shampoo    7. Encounter for screening for HIV  - HIV 1/2 Ag/Ab (4th Gen); Future    8. Need for tetanus, diphtheria, and acellular pertussis (Tdap) vaccine  - DIPH,PERTUSS,ACEL,,TET VAC,PF, ADULT (ADACEL) 2 Lf-(2.5-5-3-5 mcg)-5Lf/0.5 mL Syrg; Inject 0.5 mLs into the muscle once. for 1 dose  Dispense: 0.5 mL; Refill: 0    9. Need for shingles vaccine  - varicella-zoster gE-AS01B, PF, (SHINGRIX, PF,) 50 mcg/0.5 mL injection; Inject 0.5 mLs into the muscle once. Repeat dose x 1 in 2-6 months for 1 dose  Dispense: 1 each; Refill: 1    10. Encounter for screening for malignant neoplasm of prostate  - PSA, Screening; Future    11. Smokeless tobacco use  Discussed the risk of tobacco causing cancer for > 3 min  Offered nicotine gum  Patient may consider  in future.    Vaccinations -   There is no immunization history on file for this patient.     Medication List with Changes/Refills   Current Medications    ALLOPURINOL (ZYLOPRIM) 100 MG TABLET    Take 100 mg by mouth once daily.       Start Date: 8/11/2022 End Date: --    LOSARTAN (COZAAR) 25 MG TABLET    Take 1 tablet (25 mg total) by mouth once daily.       Start Date: 9/7/2022  End Date: --          The patient's Health Maintenance was reviewed and the following appears to be due at this time:   Health Maintenance Due   Topic Date Due    COVID-19 Vaccine (1) Never done    HIV Screening  Never done    TETANUS VACCINE  Never done     Shingles Vaccine (1 of 2) Never done    Hemoglobin A1c (Prediabetes)  02/07/2023         Follow up in about 6 months (around 10/12/2023) for Follow Up HTN/ HLD/ Hyperurecemia. In addition to their scheduled follow up, the patient has also been instructed to follow up on as needed basis.

## 2023-04-12 NOTE — PROGRESS NOTES
Please inform patient of lab results.    1. Increased chloride  Recommend to take low-salt diet       2. Increased cholesterol   Continue current regimen   Stress on the  importance of dietary modifications. Follow a low cholesterol, low saturated fat diet with less that 200mg of cholesterol a day.  Avoid fried foods and high saturated fats (high saturated fats less than 7% of calories).  Add Flax Seed/Fish Oil supplements to diet. Increase dietary fiber.  Regular exercise can reduce LDL and raise HDL.   Stressed on the importance of physical activity 5 times per week for 30 minutes per day.       3. Hemoglobin decreasing before  Currently patient asymptomatic will continue to monitor  Is confirmed with the patient if has any bloody bowel movement, blood in urine, blood in sputum     Other labwork within acceptable ranges.    Thanks,    Dr. Adams

## 2023-05-01 ENCOUNTER — PATIENT MESSAGE (OUTPATIENT)
Dept: ADMINISTRATIVE | Facility: HOSPITAL | Age: 53
End: 2023-05-01
Payer: COMMERCIAL

## 2023-05-02 ENCOUNTER — PATIENT MESSAGE (OUTPATIENT)
Dept: FAMILY MEDICINE | Facility: CLINIC | Age: 53
End: 2023-05-02
Payer: COMMERCIAL

## 2023-05-02 ENCOUNTER — PATIENT OUTREACH (OUTPATIENT)
Dept: ADMINISTRATIVE | Facility: HOSPITAL | Age: 53
End: 2023-05-02
Payer: COMMERCIAL

## 2023-05-02 ENCOUNTER — TELEPHONE (OUTPATIENT)
Dept: ADMINISTRATIVE | Facility: HOSPITAL | Age: 53
End: 2023-05-02
Payer: COMMERCIAL

## 2023-05-02 DIAGNOSIS — E79.0 HYPERURICEMIA: Primary | Chronic | ICD-10-CM

## 2023-05-02 RX ORDER — ALLOPURINOL 100 MG/1
100 TABLET ORAL DAILY
Qty: 30 TABLET | Refills: 3 | Status: SHIPPED | OUTPATIENT
Start: 2023-05-02 | End: 2023-09-05 | Stop reason: SDUPTHER

## 2023-09-05 DIAGNOSIS — E79.0 HYPERURICEMIA: Chronic | ICD-10-CM

## 2023-09-05 DIAGNOSIS — I10 PRIMARY HYPERTENSION: Primary | ICD-10-CM

## 2023-09-05 RX ORDER — LOSARTAN POTASSIUM 25 MG/1
25 TABLET ORAL DAILY
Qty: 90 TABLET | Refills: 3 | Status: SHIPPED | OUTPATIENT
Start: 2023-09-05

## 2023-09-05 RX ORDER — ALLOPURINOL 100 MG/1
100 TABLET ORAL DAILY
Qty: 30 TABLET | Refills: 3 | Status: SHIPPED | OUTPATIENT
Start: 2023-09-05 | End: 2023-09-14 | Stop reason: SDUPTHER

## 2023-09-13 ENCOUNTER — TELEPHONE (OUTPATIENT)
Dept: FAMILY MEDICINE | Facility: CLINIC | Age: 53
End: 2023-09-13
Payer: COMMERCIAL

## 2023-09-13 NOTE — TELEPHONE ENCOUNTER
----- Message from Ayanna Bains sent at 9/5/2023  9:44 AM CDT -----  Regarding: Med refill  [facility] [id number] [facility] is requesting a refill on Allopurinol 100mg tablets. Qty.30. Take 1 tablet by mouth every day.

## 2023-09-13 NOTE — PROGRESS NOTES
Patient ID: [id number]     Chief Complaint: Annual Exam      HPI:      Disclaimer:  This note is prepared using voice recognition software and as such is likely to have errors despite attempts at proofreading. Please contact me for questions.     Javi Rain is a 53 y.o. male here today for an annual wellness. Reviewed and discussed lab results. He did not have bloodwork completed prior to visit as ordered but will do so in the near future. Overall he feels well. No other complaints today.     Acute issues-  No issues today.   Blood work results discussed  Anemia  Currently Asx. Denies Hemoptysis, hematuria, melena or hematochezia    Chronic issues-  Hypertension  At Goal   On Losartan 25  Asx    Prediabetes   Hemoglobin A1c   Date Value Ref Range Status   04/12/2023 5.2 <=7.0 % Final   02/07/2022 5.4 <=7.0    01/22/2021 6.3 <<=7.0 % Final   No foot problem     Hyperuricemia  On allopurinol 100 daily  Last gout attack was on 2017     Osteoarthritis of left hip  Stable  after loosing weight intentionally     HLD-  The 10-year ASCVD risk score (Jin ALEGRIA, et al., 2019) is: 5.2%    Values used to calculate the score:      Age: 53 years      Sex: Male      Is Non- : No      Diabetic: No      Tobacco smoker: No      Systolic Blood Pressure: 148 mmHg      Is BP treated: Yes      HDL Cholesterol: 74 mg/dL      Total Cholesterol: 212 mg/dL       No past surgical history on file.    Review of patient's allergies indicates:  No Known Allergies    No outpatient medications have been marked as taking for the 9/14/23 encounter (Appointment) with Erica Adams MD.       Social History     Socioeconomic History    Marital status:    Tobacco Use    Smoking status: Former     Types: Cigarettes    Smokeless tobacco: Current     Types: Snuff    Tobacco comments:     Smokless   Substance and Sexual Activity    Alcohol use: Yes     Alcohol/week: 14.0 standard drinks of alcohol     Types: 14  Cans of beer per week    Drug use: Never    Sexual activity: Yes     Partners: Female     Birth control/protection: None     Social Determinants of Health     Financial Resource Strain: Low Risk  (4/12/2023)    Overall Financial Resource Strain (CARDIA)     Difficulty of Paying Living Expenses: Not hard at all   Food Insecurity: No Food Insecurity (4/12/2023)    Hunger Vital Sign     Worried About Running Out of Food in the Last Year: Never true     Ran Out of Food in the Last Year: Never true   Transportation Needs: No Transportation Needs (4/12/2023)    PRAPARE - Transportation     Lack of Transportation (Medical): No     Lack of Transportation (Non-Medical): No   Physical Activity: Insufficiently Active (4/12/2023)    Exercise Vital Sign     Days of Exercise per Week: 3 days     Minutes of Exercise per Session: 30 min   Stress: No Stress Concern Present (4/12/2023)    [employer] - Occupational Stress Questionnaire     Feeling of Stress : Not at all   Social Connections: Moderately Integrated (4/12/2023)    Social Connection and Isolation Panel [NHANES]     Frequency of Communication with Friends and Family: Twice a week     Frequency of Social Gatherings with Friends and Family: Twice a week     Attends [employer] Services: 1 to 4 times per year     Active Member of Clubs or Organizations: No     Attends Club or Organization Meetings: Never     Marital Status:    Housing Stability: Low Risk  (4/12/2023)    Housing Stability Vital Sign     Unable to Pay for Housing in the Last Year: No     Number of Places Lived in the Last Year: 1     Unstable Housing in the Last Year: No        Family History   Problem Relation Age of Onset    Arthritis Mother     Heart disease Maternal Grandfather     Diabetes Maternal Grandmother     Diabetes Paternal Grandmother         Patient Care Team:  Erica Adams MD as PCP - General (Family Medicine)     Subjective:     ROS    See HPI for  "details  Skin- as HPI  Constitutional: Denies Change in appetite. Denies Chills. Denies Fever. Denies Night sweats.  Respiratory: Denies Cough. Denies Shortness of breath. Denies Shortness of breath with exertion. Denies Wheezing.  Cardiovascular: DeniesChest pain at rest. Denies Chest pain with exertion. Denies Irregular heartbeat. Denies Palpitations. Denies Edema.  Gastrointestinal: Denies Abdominal pain. DeniesDiarrhea. Denies Nausea. Denies Vomiting. Denies Hematemesis or Hematochezia.  Genitourinary: Denies Dysuria. Denies Urinary frequency. Denies Urinary urgency. Denies Blood in urine.  Psychiatric: Denies Depression. Denies Anxiety. Denies Suicidal/Homicidal ideations.    All Other ROS: Negative except as stated in HPI.       Objective:     BP (!) 148/80 (BP Location: Left arm, Patient Position: Sitting, BP Method: Large (Manual))   Pulse 78   Temp 98.2 °F (36.8 °C) (Oral)   Resp 17   Ht 5' 8" (1.727 m)   Wt 91.1 kg (200 lb 14.4 oz)   SpO2 97%   BMI 30.55 kg/m²     Physical Exam    General: Alert and oriented, No acute distress.  Head: Normocephalic, Atraumatic.  Eye: Pupils are equal, round and reactive to light, Extraocular movements are intact, Sclera non-icteric.  Ears/Nose/Throat: Normal, Mucosa moist,Clear.  Neck/Thyroid: Supple, Non-tender, No carotid bruit, No palpable thyromegaly or thyroid nodule, No lymphadenopathy, No JVD, Full range of motion.  Respiratory: Clear to auscultation bilaterally; No wheezes, rales or rhonchi,Non-labored respirations, Symmetrical chest wall expansion.  Cardiovascular: Regular rate and rhythm, S1/S2 normal, No murmurs, rubs or gallops.  Gastrointestinal: Soft, Non-tender, Non-distended, Normal bowel sounds, No palpable organomegaly.  Neurologic: No focal deficits, Cranial Nerves II-XII are grossly intact, Motor strength normal upper and lower extremities, Sensory exam intact.  Psychiatric: Normal interaction, Coherent speech, Euthymic mood, Appropriate affect "       Assessment:       ICD-10-CM ICD-9-CM   1. Wellness examination  Z00.00 V70.0   2. Prediabetes  R73.03 790.29   3. Hyperuricemia  E79.0 790.6   4. Benign essential HTN  I10 401.1   5. Mixed hyperlipidemia  E78.2 272.2   6. Encounter for screening for malignant neoplasm of colon  Z12.11 V76.51        Plan:       Health Maintenance Topics with due status: Not Due       Topic Last Completion Date    Colorectal Cancer Screening 02/10/2021    Hemoglobin A1c (Prediabetes) 04/12/2023    Lipid Panel 04/12/2023        1. Wellness examination  AAA Screening -  (65-75) Will order [place] AAA for Welcome to Medicare.  Lung Cancer-(50-80) Smoker/ Ex smoker- Smoked > 16 years ago  Prostate Cancer Screening (50-74)- Father had Prostrate Cancer. PSA wnl  Colon Cancer Screening(45-75) - Cologuard negative. Next due in 2024  Eye Exam - Recommend annually.   Dental Exam - Recommend biannual exams.     2. Primary hypertension  Pressure slightly elevated on arrival in clinic at goal   Recommend the patient to continue Rx as prescribed  Recommend the patient to continue dash diet along with 35 minutes of brisk walking     3. Mixed hyperlipidemia  Currently not on statins   Does not qualify statins per ASCVD score as of now  - Lipid Panel; Future    4. Prediabetes  -diet as above  - Hemoglobin A1C; Future    5. Hyperuricemia  -avoid beer/red meat and continue allopurinol  - Uric Acid; Future    6. Anemia  Cbc in 6 months    7. Encounter for screening for malignant neoplasm of prostate  - PSA, Screening; Future    8. Smokeless tobacco use  Discussed the risk of tobacco causing cancer for > 3 min  Offered nicotine gum  Patient may consider  in future.    9. Well adult check   - CBC Auto Differential; Future  - Comprehensive Metabolic Panel; Future  - TSH; Future  - Urinalysis; Future  - Microalbumin/Creatinine Ratio, Urine; Future  - Urinalysis  - Urinalysis, Microscopic    Vaccinations -   There is no immunization history on file for this  patient.     Medication List with Changes/Refills   Current Medications    ALLOPURINOL (ZYLOPRIM) 100 MG TABLET    Take 1 tablet (100 mg total) by mouth once daily.       Start Date: 9/5/2023  End Date: --    LOSARTAN (COZAAR) 25 MG TABLET    Take 1 tablet (25 mg total) by mouth once daily.       Start Date: 9/5/2023  End Date: --    TRIAMCINOLONE ACETONIDE 0.1% (KENALOG) 0.1 % CREAM    Apply topically 2 (two) times daily.       Start Date: 4/12/2023 End Date: --          The patient's Health Maintenance was reviewed and the following appears to be due at this time:   Health Maintenance Due   Topic Date Due    COVID-19 Vaccine (1) Never done    TETANUS VACCINE  Never done    Shingles Vaccine (1 of 2) Never done    Influenza Vaccine (1) 09/01/2023         Follow up in about 6 months (around 3/14/2024) for Follow Up anemia. 1 AW. In addition to their scheduled follow up, the patient has also been instructed to follow up on as needed basis.

## 2023-09-14 ENCOUNTER — OFFICE VISIT (OUTPATIENT)
Dept: FAMILY MEDICINE | Facility: CLINIC | Age: 53
End: 2023-09-14
Payer: COMMERCIAL

## 2023-09-14 VITALS
DIASTOLIC BLOOD PRESSURE: 78 MMHG | BODY MASS INDEX: 30.45 KG/M2 | TEMPERATURE: 98 F | OXYGEN SATURATION: 97 % | WEIGHT: 200.88 LBS | RESPIRATION RATE: 17 BRPM | SYSTOLIC BLOOD PRESSURE: 138 MMHG | HEART RATE: 78 BPM | HEIGHT: 68 IN

## 2023-09-14 DIAGNOSIS — I10 BENIGN ESSENTIAL HTN: ICD-10-CM

## 2023-09-14 DIAGNOSIS — E79.0 HYPERURICEMIA: ICD-10-CM

## 2023-09-14 DIAGNOSIS — Z12.11 ENCOUNTER FOR SCREENING FOR MALIGNANT NEOPLASM OF COLON: ICD-10-CM

## 2023-09-14 DIAGNOSIS — E78.2 MIXED HYPERLIPIDEMIA: ICD-10-CM

## 2023-09-14 DIAGNOSIS — Z00.00 WELLNESS EXAMINATION: Primary | ICD-10-CM

## 2023-09-14 DIAGNOSIS — D64.9 ANEMIA, UNSPECIFIED TYPE: ICD-10-CM

## 2023-09-14 DIAGNOSIS — Z00.00 WELL ADULT HEALTH CHECK: ICD-10-CM

## 2023-09-14 DIAGNOSIS — R73.03 PREDIABETES: ICD-10-CM

## 2023-09-14 DIAGNOSIS — Z12.5 ENCOUNTER FOR SCREENING FOR MALIGNANT NEOPLASM OF PROSTATE: ICD-10-CM

## 2023-09-14 PROCEDURE — 1159F MED LIST DOCD IN RCRD: CPT | Mod: CPTII,,, | Performed by: STUDENT IN AN ORGANIZED HEALTH CARE EDUCATION/TRAINING PROGRAM

## 2023-09-14 PROCEDURE — 4010F ACE/ARB THERAPY RXD/TAKEN: CPT | Mod: CPTII,,, | Performed by: STUDENT IN AN ORGANIZED HEALTH CARE EDUCATION/TRAINING PROGRAM

## 2023-09-14 PROCEDURE — 3061F NEG MICROALBUMINURIA REV: CPT | Mod: CPTII,,, | Performed by: STUDENT IN AN ORGANIZED HEALTH CARE EDUCATION/TRAINING PROGRAM

## 2023-09-14 PROCEDURE — 3066F NEPHROPATHY DOC TX: CPT | Mod: CPTII,,, | Performed by: STUDENT IN AN ORGANIZED HEALTH CARE EDUCATION/TRAINING PROGRAM

## 2023-09-14 PROCEDURE — 3008F BODY MASS INDEX DOCD: CPT | Mod: CPTII,,, | Performed by: STUDENT IN AN ORGANIZED HEALTH CARE EDUCATION/TRAINING PROGRAM

## 2023-09-14 PROCEDURE — 99396 PREV VISIT EST AGE 40-64: CPT | Mod: ,,, | Performed by: STUDENT IN AN ORGANIZED HEALTH CARE EDUCATION/TRAINING PROGRAM

## 2023-09-14 PROCEDURE — 3008F PR BODY MASS INDEX (BMI) DOCUMENTED: ICD-10-PCS | Mod: CPTII,,, | Performed by: STUDENT IN AN ORGANIZED HEALTH CARE EDUCATION/TRAINING PROGRAM

## 2023-09-14 PROCEDURE — 3078F DIAST BP <80 MM HG: CPT | Mod: CPTII,,, | Performed by: STUDENT IN AN ORGANIZED HEALTH CARE EDUCATION/TRAINING PROGRAM

## 2023-09-14 PROCEDURE — 1159F PR MEDICATION LIST DOCUMENTED IN MEDICAL RECORD: ICD-10-PCS | Mod: CPTII,,, | Performed by: STUDENT IN AN ORGANIZED HEALTH CARE EDUCATION/TRAINING PROGRAM

## 2023-09-14 PROCEDURE — 1160F PR REVIEW ALL MEDS BY PRESCRIBER/CLIN PHARMACIST DOCUMENTED: ICD-10-PCS | Mod: CPTII,,, | Performed by: STUDENT IN AN ORGANIZED HEALTH CARE EDUCATION/TRAINING PROGRAM

## 2023-09-14 PROCEDURE — 99396 PR PREVENTIVE VISIT,EST,40-64: ICD-10-PCS | Mod: ,,, | Performed by: STUDENT IN AN ORGANIZED HEALTH CARE EDUCATION/TRAINING PROGRAM

## 2023-09-14 PROCEDURE — 3075F PR MOST RECENT SYSTOLIC BLOOD PRESS GE 130-139MM HG: ICD-10-PCS | Mod: CPTII,,, | Performed by: STUDENT IN AN ORGANIZED HEALTH CARE EDUCATION/TRAINING PROGRAM

## 2023-09-14 PROCEDURE — 99213 OFFICE O/P EST LOW 20 MIN: CPT | Mod: 25,,, | Performed by: STUDENT IN AN ORGANIZED HEALTH CARE EDUCATION/TRAINING PROGRAM

## 2023-09-14 PROCEDURE — 3044F HG A1C LEVEL LT 7.0%: CPT | Mod: CPTII,,, | Performed by: STUDENT IN AN ORGANIZED HEALTH CARE EDUCATION/TRAINING PROGRAM

## 2023-09-14 PROCEDURE — 99213 PR OFFICE/OUTPT VISIT, EST, LEVL III, 20-29 MIN: ICD-10-PCS | Mod: 25,,, | Performed by: STUDENT IN AN ORGANIZED HEALTH CARE EDUCATION/TRAINING PROGRAM

## 2023-09-14 PROCEDURE — 1160F RVW MEDS BY RX/DR IN RCRD: CPT | Mod: CPTII,,, | Performed by: STUDENT IN AN ORGANIZED HEALTH CARE EDUCATION/TRAINING PROGRAM

## 2023-09-14 PROCEDURE — 3078F PR MOST RECENT DIASTOLIC BLOOD PRESSURE < 80 MM HG: ICD-10-PCS | Mod: CPTII,,, | Performed by: STUDENT IN AN ORGANIZED HEALTH CARE EDUCATION/TRAINING PROGRAM

## 2023-09-14 PROCEDURE — 3066F PR DOCUMENTATION OF TREATMENT FOR NEPHROPATHY: ICD-10-PCS | Mod: CPTII,,, | Performed by: STUDENT IN AN ORGANIZED HEALTH CARE EDUCATION/TRAINING PROGRAM

## 2023-09-14 PROCEDURE — 3044F PR MOST RECENT HEMOGLOBIN A1C LEVEL <7.0%: ICD-10-PCS | Mod: CPTII,,, | Performed by: STUDENT IN AN ORGANIZED HEALTH CARE EDUCATION/TRAINING PROGRAM

## 2023-09-14 PROCEDURE — 3061F PR NEG MICROALBUMINURIA RESULT DOCUMENTED/REVIEW: ICD-10-PCS | Mod: CPTII,,, | Performed by: STUDENT IN AN ORGANIZED HEALTH CARE EDUCATION/TRAINING PROGRAM

## 2023-09-14 PROCEDURE — 3075F SYST BP GE 130 - 139MM HG: CPT | Mod: CPTII,,, | Performed by: STUDENT IN AN ORGANIZED HEALTH CARE EDUCATION/TRAINING PROGRAM

## 2023-09-14 PROCEDURE — 4010F PR ACE/ARB THEARPY RXD/TAKEN: ICD-10-PCS | Mod: CPTII,,, | Performed by: STUDENT IN AN ORGANIZED HEALTH CARE EDUCATION/TRAINING PROGRAM

## 2023-09-14 RX ORDER — ALLOPURINOL 100 MG/1
100 TABLET ORAL DAILY
Qty: 30 TABLET | Refills: 3 | Status: SHIPPED | OUTPATIENT
Start: 2023-09-14

## 2023-10-05 ENCOUNTER — PATIENT MESSAGE (OUTPATIENT)
Dept: FAMILY MEDICINE | Facility: CLINIC | Age: 53
End: 2023-10-05
Payer: COMMERCIAL

## 2023-10-09 ENCOUNTER — TELEPHONE (OUTPATIENT)
Dept: FAMILY MEDICINE | Facility: CLINIC | Age: 53
End: 2023-10-09
Payer: COMMERCIAL

## 2023-10-09 NOTE — TELEPHONE ENCOUNTER
----- Message from Usha Lambert sent at 10/9/2023 12:03 PM CDT -----  .Type:  Needs Medical Advice    Who Called: pt  Symptoms (please be specific):    How long has patient had these symptoms:    Pharmacy name and phone #:    Would the patient rather a call back or a response via [facility]?   Best Call Back Number: [phone]  Additional Information: pt calling to confirm do he still needs the appt for 10/12/23 due to his wellness was last month

## 2024-02-21 ENCOUNTER — PATIENT MESSAGE (OUTPATIENT)
Dept: FAMILY MEDICINE | Facility: CLINIC | Age: 54
End: 2024-02-21
Payer: COMMERCIAL

## 2024-02-28 DIAGNOSIS — R19.5 POSITIVE COLORECTAL CANCER SCREENING USING COLOGUARD TEST: Primary | ICD-10-CM

## 2024-02-28 LAB — NONINV COLON CA DNA+OCC BLD SCRN STL QL: POSITIVE

## 2024-02-29 ENCOUNTER — TELEPHONE (OUTPATIENT)
Dept: FAMILY MEDICINE | Facility: CLINIC | Age: 54
End: 2024-02-29
Payer: COMMERCIAL

## 2024-02-29 NOTE — PROGRESS NOTES
Please inform patient of abnormal colon cancer screening.    Patient will need to be referred for diagnostic colonoscopy.    Will send referral to Dr. Cat unless the patient has a preferred GI physician.     If they do not hear from the gastro office within 2 weeks, they will need to notify us to prevent delay in care    Please Give ER precautions regarding Gi bleed    Please stop NSAIDS and if taking ASA then please hold and talk to the cardiologist      Thanks!  Dr. Adams

## 2024-02-29 NOTE — TELEPHONE ENCOUNTER
----- Message from Erica Adams MD sent at 2/28/2024  6:11 PM CST -----  Please inform patient of abnormal colon cancer screening.    Patient will need to be referred for diagnostic colonoscopy.    Will send referral to Dr. Cat unless the patient has a preferred GI physician.     If they do not hear from the gastro office within 2 weeks, they will need to notify us to prevent delay in care    Please Give ER precautions regarding Gi bleed    Please stop NSAIDS and if taking ASA then please hold and talk to the cardiologist      Thanks!  Dr. Adams

## 2024-03-01 ENCOUNTER — LAB VISIT (OUTPATIENT)
Dept: LAB | Facility: HOSPITAL | Age: 54
End: 2024-03-01
Attending: STUDENT IN AN ORGANIZED HEALTH CARE EDUCATION/TRAINING PROGRAM
Payer: COMMERCIAL

## 2024-03-01 DIAGNOSIS — D64.9 ANEMIA, UNSPECIFIED TYPE: ICD-10-CM

## 2024-03-01 LAB
BASOPHILS # BLD AUTO: 0.05 X10(3)/MCL
BASOPHILS NFR BLD AUTO: 0.7 %
EOSINOPHIL # BLD AUTO: 0.25 X10(3)/MCL (ref 0–0.9)
EOSINOPHIL NFR BLD AUTO: 3.4 %
ERYTHROCYTE [DISTWIDTH] IN BLOOD BY AUTOMATED COUNT: 12.9 % (ref 11.5–17)
HCT VFR BLD AUTO: 43.4 % (ref 42–52)
HGB BLD-MCNC: 14.2 G/DL (ref 14–18)
IMM GRANULOCYTES # BLD AUTO: 0.05 X10(3)/MCL (ref 0–0.04)
IMM GRANULOCYTES NFR BLD AUTO: 0.7 %
LYMPHOCYTES # BLD AUTO: 1.88 X10(3)/MCL (ref 0.6–4.6)
LYMPHOCYTES NFR BLD AUTO: 25.7 %
MCH RBC QN AUTO: 32.1 PG (ref 27–31)
MCHC RBC AUTO-ENTMCNC: 32.7 G/DL (ref 33–36)
MCV RBC AUTO: 98.2 FL (ref 80–94)
MONOCYTES # BLD AUTO: 0.7 X10(3)/MCL (ref 0.1–1.3)
MONOCYTES NFR BLD AUTO: 9.6 %
NEUTROPHILS # BLD AUTO: 4.39 X10(3)/MCL (ref 2.1–9.2)
NEUTROPHILS NFR BLD AUTO: 59.9 %
NRBC BLD AUTO-RTO: 0 %
PLATELET # BLD AUTO: 222 X10(3)/MCL (ref 130–400)
PMV BLD AUTO: 9.1 FL (ref 7.4–10.4)
RBC # BLD AUTO: 4.42 X10(6)/MCL (ref 4.7–6.1)
WBC # SPEC AUTO: 7.32 X10(3)/MCL (ref 4.5–11.5)

## 2024-03-01 PROCEDURE — 85025 COMPLETE CBC W/AUTO DIFF WBC: CPT

## 2024-03-01 PROCEDURE — 36415 COLL VENOUS BLD VENIPUNCTURE: CPT

## 2024-03-13 ENCOUNTER — OFFICE VISIT (OUTPATIENT)
Dept: FAMILY MEDICINE | Facility: CLINIC | Age: 54
End: 2024-03-13
Payer: COMMERCIAL

## 2024-03-13 VITALS
HEIGHT: 68 IN | HEART RATE: 76 BPM | SYSTOLIC BLOOD PRESSURE: 112 MMHG | OXYGEN SATURATION: 95 % | WEIGHT: 207 LBS | RESPIRATION RATE: 17 BRPM | TEMPERATURE: 98 F | DIASTOLIC BLOOD PRESSURE: 82 MMHG | BODY MASS INDEX: 31.37 KG/M2

## 2024-03-13 DIAGNOSIS — I10 BENIGN ESSENTIAL HTN: ICD-10-CM

## 2024-03-13 DIAGNOSIS — D64.9 ANEMIA, UNSPECIFIED TYPE: ICD-10-CM

## 2024-03-13 DIAGNOSIS — R19.5 POSITIVE COLORECTAL CANCER SCREENING USING COLOGUARD TEST: ICD-10-CM

## 2024-03-13 DIAGNOSIS — Z23 NEED FOR SHINGLES VACCINE: ICD-10-CM

## 2024-03-13 DIAGNOSIS — E66.09 CLASS 1 OBESITY DUE TO EXCESS CALORIES WITH BODY MASS INDEX (BMI) OF 31.0 TO 31.9 IN ADULT, UNSPECIFIED WHETHER SERIOUS COMORBIDITY PRESENT: ICD-10-CM

## 2024-03-13 DIAGNOSIS — R73.03 PREDIABETES: ICD-10-CM

## 2024-03-13 DIAGNOSIS — E78.2 MIXED HYPERLIPIDEMIA: ICD-10-CM

## 2024-03-13 DIAGNOSIS — E79.0 HYPERURICEMIA: ICD-10-CM

## 2024-03-13 PROBLEM — R74.01 TRANSAMINITIS: Status: ACTIVE | Noted: 2024-03-13

## 2024-03-13 PROCEDURE — 1159F MED LIST DOCD IN RCRD: CPT | Mod: CPTII,,, | Performed by: STUDENT IN AN ORGANIZED HEALTH CARE EDUCATION/TRAINING PROGRAM

## 2024-03-13 PROCEDURE — 3074F SYST BP LT 130 MM HG: CPT | Mod: CPTII,,, | Performed by: STUDENT IN AN ORGANIZED HEALTH CARE EDUCATION/TRAINING PROGRAM

## 2024-03-13 PROCEDURE — 3079F DIAST BP 80-89 MM HG: CPT | Mod: CPTII,,, | Performed by: STUDENT IN AN ORGANIZED HEALTH CARE EDUCATION/TRAINING PROGRAM

## 2024-03-13 PROCEDURE — 1160F RVW MEDS BY RX/DR IN RCRD: CPT | Mod: CPTII,,, | Performed by: STUDENT IN AN ORGANIZED HEALTH CARE EDUCATION/TRAINING PROGRAM

## 2024-03-13 PROCEDURE — 99214 OFFICE O/P EST MOD 30 MIN: CPT | Mod: ,,, | Performed by: STUDENT IN AN ORGANIZED HEALTH CARE EDUCATION/TRAINING PROGRAM

## 2024-03-13 PROCEDURE — 4010F ACE/ARB THERAPY RXD/TAKEN: CPT | Mod: CPTII,,, | Performed by: STUDENT IN AN ORGANIZED HEALTH CARE EDUCATION/TRAINING PROGRAM

## 2024-03-13 PROCEDURE — 3008F BODY MASS INDEX DOCD: CPT | Mod: CPTII,,, | Performed by: STUDENT IN AN ORGANIZED HEALTH CARE EDUCATION/TRAINING PROGRAM

## 2024-03-13 RX ORDER — ZOSTER VACCINE RECOMBINANT, ADJUVANTED 50 MCG/0.5
0.5 KIT INTRAMUSCULAR ONCE
Qty: 1 EACH | Refills: 1 | Status: SHIPPED | OUTPATIENT
Start: 2024-03-13 | End: 2024-03-13

## 2024-03-13 NOTE — PROGRESS NOTES
Patient ID: [id number]     Chief Complaint: Anemia (6 MTH f/u)        HPI:      Disclaimer:  This note is prepared using voice recognition software and as such is likely to have errors despite attempts at proofreading. Please contact me for questions.     Javi Rain is a 53 y.o. male here today for the following    Acute Issues-  Chronic issues-  Hypertension  At Goal   On Losartan 25  Asx     Prediabetes         Hemoglobin A1c   Date Value Ref Range Status   04/12/2023 5.2 <=7.0 % Final   02/07/2022 5.4 <=7.0     01/22/2021 6.3 <<=7.0 % Final   Diet controlled     Hyperuricemia  On allopurinol 100 daily  Last gout attack was on 2017      Osteoarthritis of left hip  Stable  after loosing weight intentionally      HLD-  The 10-year ASCVD risk score (Jin ALEGRIA, et al., 2019) is: 3.2%    Values used to calculate the score:      Age: 53 years      Sex: Male      Is Non- : No      Diabetic: No      Tobacco smoker: No      Systolic Blood Pressure: 112 mmHg      Is BP treated: Yes      HDL Cholesterol: 74 mg/dL      Total Cholesterol: 212 mg/dL      Anemia- Resolved    Positive Cologuard test   Referral sent to Dr. Cat's  Patient waiting on getting a call from their office   Currently asymptomatic      Chronic Issues-    ----------------------------  Gout  Hyperlipidemia  Hypertension  Hypertension  Hyperuricemia  Obesity  Osteoarthritis of left hip  Prediabetes     History reviewed. No pertinent surgical history.    Review of patient's allergies indicates:  No Known Allergies    Current Outpatient Medications   Medication Instructions    allopurinoL (ZYLOPRIM) 100 mg, Oral, Daily    losartan (COZAAR) 25 mg, Oral, Daily    triamcinolone acetonide 0.1% (KENALOG) 0.1 % cream Topical (Top), 2 times daily       Social History     Socioeconomic History    Marital status:    Tobacco Use    Smoking status: Former     Types: Cigarettes     Passive exposure: Past    Smokeless tobacco:  Current     Types: Snuff    Tobacco comments:     Smokless   Substance and Sexual Activity    Alcohol use: Yes     Alcohol/week: 14.0 standard drinks of alcohol     Types: 14 Cans of beer per week    Drug use: Never    Sexual activity: Yes     Partners: Female     Birth control/protection: None     Social Determinants of Health     Financial Resource Strain: Low Risk  (4/12/2023)    Overall Financial Resource Strain (CARDIA)     Difficulty of Paying Living Expenses: Not hard at all   Food Insecurity: No Food Insecurity (4/12/2023)    Hunger Vital Sign     Worried About Running Out of Food in the Last Year: Never true     Ran Out of Food in the Last Year: Never true   Transportation Needs: No Transportation Needs (4/12/2023)    PRAPARE - Transportation     Lack of Transportation (Medical): No     Lack of Transportation (Non-Medical): No   Physical Activity: Insufficiently Active (4/12/2023)    Exercise Vital Sign     Days of Exercise per Week: 3 days     Minutes of Exercise per Session: 30 min   Stress: No Stress Concern Present (4/12/2023)    [employer] - Occupational Stress Questionnaire     Feeling of Stress : Not at all   Social Connections: Moderately Integrated (4/12/2023)    Social Connection and Isolation Panel [NHANES]     Frequency of Communication with Friends and Family: Twice a week     Frequency of Social Gatherings with Friends and Family: Twice a week     Attends [employer] Services: 1 to 4 times per year     Active Member of Clubs or Organizations: No     Attends Club or Organization Meetings: Never     Marital Status:    Housing Stability: Low Risk  (4/12/2023)    Housing Stability Vital Sign     Unable to Pay for Housing in the Last Year: No     Number of Places Lived in the Last Year: 1     Unstable Housing in the Last Year: No        Family History   Problem Relation Age of Onset    Arthritis Mother     Cancer Mother     Heart disease Maternal Grandfather      "Diabetes Maternal Grandmother     Diabetes Paternal Grandmother     Heart disease Paternal Grandfather         Patient Care Team:  Erica Adams MD as PCP - General (Family Medicine)     Subjective:     ROS    See HPI for details    Constitutional: Denies Change in appetite. Denies Chills. Denies Fever. Denies Night sweats.  Respiratory: Denies Cough. Denies Shortness of breath. Denies Shortness of breath with exertion. Denies Wheezing.  Cardiovascular: DeniesChest pain at rest. Denies Chest pain with exertion. Denies Irregular heartbeat. Denies Palpitations. Denies Edema.  Gastrointestinal: Denies Abdominal pain. DeniesDiarrhea. Denies Nausea. Denies Vomiting. Denies Hematemesis or Hematochezia.  Genitourinary: Denies Dysuria. Denies Urinary frequency. Denies Urinary urgency. Denies Blood in urine.  Endocrine: Denies Cold intolerance. Denies Excessive thirst. Denies Heat intolerance. Denies Weight loss. Denies Weight gain.  Musculoskeletal: Denies Painful joints. Denies Weakness.  Integumentary: Denies Rash. Denies Itching. Denies Dry skin.  Neurologic: Denies Dizziness. Denies Fainting. Denies Headache.  Psychiatric: Denies Depression. Denies Anxiety. Denies Suicidal/Homicidal ideations.    All Other ROS: Negative except as stated in HPI.  Objective:     Visit Vitals  /82 (BP Location: Right arm, Patient Position: Sitting, BP Method: Large (Manual))   Pulse 76   Temp 98.3 °F (36.8 °C) (Oral)   Resp 17   Ht 5' 8" (1.727 m)   Wt 93.9 kg (207 lb)   SpO2 95%   BMI 31.47 kg/m²       Physical Exam    General: Alert and oriented, obese, obese, No acute distress.  Respiratory: Clear to auscultation bilaterally; No wheezes, rales or rhonchi,Non-labored respirations, Symmetrical chest wall expansion.  Cardiovascular: Regular rate and rhythm, S1/S2 normal, No murmurs, rubs or gallops.  Gastrointestinal: Soft, Non-tender, Non-distended, Normal bowel sounds, No palpable organomegaly.  Musculoskeletal: Normal range " of motion.  Extremities: No clubbing, cyanosis or edema  Neurologic: No focal deficits  Psychiatric: Normal interaction, Coherent speech, Euthymic mood, Appropriate affect   Assessment:       ICD-10-CM ICD-9-CM   1. Benign essential HTN  I10 401.1   2. Mixed hyperlipidemia  E78.2 272.2   3. Prediabetes  R73.03 790.29   4. Hyperuricemia  E79.0 790.6   5. Anemia, unspecified type  D64.9 285.9   6. Class 1 obesity due to excess calories with body mass index (BMI) of 31.0 to 31.9 in adult, unspecified whether serious comorbidity present  E66.09 278.00    Z68.31 V85.31   7. Positive colorectal cancer screening using Cologuard test  R19.5 787.7   8. Need for shingles vaccine  Z23 V04.89        Plan:     1. Benign essential HTN  Continue losartan as prescribed  35 minutes of brisk walking and dash diet    2. Mixed hyperlipidemia  3. Prediabetes  Diet-controlled   Continue Mediterranean diet    4. Hyperuricemia  Continue allopurinol as prescribed  Avoid red meat/alcohol    5. Anemia, unspecified type  Resolved    6. Prediabetes  Class 1 obesity due to excess calories with body mass index (BMI) of 31.0 to 31.9 in adult, unspecified whether serious comorbidity present  Diet-controlled   Recommend the patient to start 35 minutes of brisk walking and Mediterranean diet    7. Positive colorectal cancer screening using Cologuard test  Keep scheduled visit with Dr. Cat for diagnostic colonoscopy    8. Need for shingles vaccine  -     varicella-zoster [id number], PF, (SHINGRIX, PF,) 50 mcg/0.5 mL injection; Inject 0.5 mLs into the muscle once. Please administer shingrix vaccine now and repeat dose in 2-6 months. for 1 dose  Dispense: 1 each; Refill: 1           Medication List with Changes/Refills   Current Medications    ALLOPURINOL (ZYLOPRIM) 100 MG TABLET    Take 1 tablet (100 mg total) by mouth once daily.       Start Date: 9/14/2023 End Date: --    LOSARTAN (COZAAR) 25 MG TABLET    Take 1 tablet (25 mg total) by mouth once  daily.       Start Date: 9/5/2023  End Date: --    TRIAMCINOLONE ACETONIDE 0.1% (KENALOG) 0.1 % CREAM    Apply topically 2 (two) times daily.       Start Date: 4/12/2023 End Date: --          Follow up for Annual Wellness.   In addition to their scheduled follow up, the patient has also been instructed to follow up on as needed basis.

## 2024-05-01 DIAGNOSIS — E79.0 HYPERURICEMIA: ICD-10-CM

## 2024-05-01 DIAGNOSIS — E79.0 HYPERURICEMIA: Primary | ICD-10-CM

## 2024-05-01 RX ORDER — ALLOPURINOL 100 MG/1
100 TABLET ORAL DAILY
Qty: 30 TABLET | Refills: 3 | Status: SHIPPED | OUTPATIENT
Start: 2024-05-01 | End: 2024-05-01 | Stop reason: SDUPTHER

## 2024-05-01 RX ORDER — ALLOPURINOL 100 MG/1
100 TABLET ORAL DAILY
Qty: 30 TABLET | Refills: 3 | Status: SHIPPED | OUTPATIENT
Start: 2024-05-01

## 2024-05-23 ENCOUNTER — DOCUMENTATION ONLY (OUTPATIENT)
Dept: FAMILY MEDICINE | Facility: CLINIC | Age: 54
End: 2024-05-23
Payer: COMMERCIAL

## 2024-05-23 LAB — CRC RECOMMENDATION EXT: NORMAL

## 2024-07-03 DIAGNOSIS — L30.9 ECZEMA, UNSPECIFIED TYPE: ICD-10-CM

## 2024-07-03 RX ORDER — TRIAMCINOLONE ACETONIDE 1 MG/G
CREAM TOPICAL 2 TIMES DAILY
Qty: 30 G | Refills: 0 | Status: SHIPPED | OUTPATIENT
Start: 2024-07-03

## 2024-08-15 ENCOUNTER — PATIENT MESSAGE (OUTPATIENT)
Dept: FAMILY MEDICINE | Facility: CLINIC | Age: 54
End: 2024-08-15
Payer: COMMERCIAL

## 2024-09-04 DIAGNOSIS — I10 PRIMARY HYPERTENSION: ICD-10-CM

## 2024-09-04 RX ORDER — LOSARTAN POTASSIUM 25 MG/1
25 TABLET ORAL DAILY
Qty: 90 TABLET | Refills: 3 | Status: SHIPPED | OUTPATIENT
Start: 2024-09-04

## 2024-09-12 ENCOUNTER — LAB VISIT (OUTPATIENT)
Dept: LAB | Facility: HOSPITAL | Age: 54
End: 2024-09-12
Attending: STUDENT IN AN ORGANIZED HEALTH CARE EDUCATION/TRAINING PROGRAM
Payer: COMMERCIAL

## 2024-09-12 DIAGNOSIS — Z00.00 WELL ADULT HEALTH CHECK: ICD-10-CM

## 2024-09-12 DIAGNOSIS — Z12.5 ENCOUNTER FOR SCREENING FOR MALIGNANT NEOPLASM OF PROSTATE: ICD-10-CM

## 2024-09-12 LAB
ALBUMIN SERPL-MCNC: 4.1 G/DL (ref 3.5–5)
ALBUMIN/GLOB SERPL: 1.6 RATIO (ref 1.1–2)
ALP SERPL-CCNC: 38 UNIT/L (ref 40–150)
ALT SERPL-CCNC: 31 UNIT/L (ref 0–55)
ANION GAP SERPL CALC-SCNC: 6 MEQ/L
AST SERPL-CCNC: 28 UNIT/L (ref 5–34)
BASOPHILS # BLD AUTO: 0.06 X10(3)/MCL
BASOPHILS NFR BLD AUTO: 0.9 %
BILIRUB SERPL-MCNC: 0.3 MG/DL
BUN SERPL-MCNC: 14.2 MG/DL (ref 8.4–25.7)
CALCIUM SERPL-MCNC: 9.6 MG/DL (ref 8.4–10.2)
CHLORIDE SERPL-SCNC: 110 MMOL/L (ref 98–107)
CHOLEST SERPL-MCNC: 216 MG/DL
CHOLEST/HDLC SERPL: 3 {RATIO} (ref 0–5)
CO2 SERPL-SCNC: 26 MMOL/L (ref 22–29)
CREAT SERPL-MCNC: 0.91 MG/DL (ref 0.73–1.18)
CREAT/UREA NIT SERPL: 16
EOSINOPHIL # BLD AUTO: 0.11 X10(3)/MCL (ref 0–0.9)
EOSINOPHIL NFR BLD AUTO: 1.6 %
ERYTHROCYTE [DISTWIDTH] IN BLOOD BY AUTOMATED COUNT: 12.8 % (ref 11.5–17)
EST. AVERAGE GLUCOSE BLD GHB EST-MCNC: 105.4 MG/DL
GFR SERPLBLD CREATININE-BSD FMLA CKD-EPI: >60 ML/MIN/1.73/M2
GLOBULIN SER-MCNC: 2.6 GM/DL (ref 2.4–3.5)
GLUCOSE SERPL-MCNC: 115 MG/DL (ref 74–100)
HBA1C MFR BLD: 5.3 %
HCT VFR BLD AUTO: 43 % (ref 42–52)
HDLC SERPL-MCNC: 66 MG/DL (ref 35–60)
HGB BLD-MCNC: 14.5 G/DL (ref 14–18)
IMM GRANULOCYTES # BLD AUTO: 0.05 X10(3)/MCL (ref 0–0.04)
IMM GRANULOCYTES NFR BLD AUTO: 0.7 %
LDLC SERPL CALC-MCNC: 136 MG/DL (ref 50–140)
LYMPHOCYTES # BLD AUTO: 1.61 X10(3)/MCL (ref 0.6–4.6)
LYMPHOCYTES NFR BLD AUTO: 22.9 %
MCH RBC QN AUTO: 32.5 PG (ref 27–31)
MCHC RBC AUTO-ENTMCNC: 33.7 G/DL (ref 33–36)
MCV RBC AUTO: 96.4 FL (ref 80–94)
MONOCYTES # BLD AUTO: 0.74 X10(3)/MCL (ref 0.1–1.3)
MONOCYTES NFR BLD AUTO: 10.5 %
NEUTROPHILS # BLD AUTO: 4.47 X10(3)/MCL (ref 2.1–9.2)
NEUTROPHILS NFR BLD AUTO: 63.4 %
NRBC BLD AUTO-RTO: 0 %
PLATELET # BLD AUTO: 240 X10(3)/MCL (ref 130–400)
PMV BLD AUTO: 9.5 FL (ref 7.4–10.4)
POTASSIUM SERPL-SCNC: 4.8 MMOL/L (ref 3.5–5.1)
PROT SERPL-MCNC: 6.7 GM/DL (ref 6.4–8.3)
PSA SERPL-MCNC: 2.41 NG/ML
RBC # BLD AUTO: 4.46 X10(6)/MCL (ref 4.7–6.1)
SODIUM SERPL-SCNC: 142 MMOL/L (ref 136–145)
TRIGL SERPL-MCNC: 69 MG/DL (ref 34–140)
TSH SERPL-ACNC: 1.22 UIU/ML (ref 0.35–4.94)
VLDLC SERPL CALC-MCNC: 14 MG/DL
WBC # BLD AUTO: 7.04 X10(3)/MCL (ref 4.5–11.5)

## 2024-09-12 PROCEDURE — 85025 COMPLETE CBC W/AUTO DIFF WBC: CPT

## 2024-09-12 PROCEDURE — 80053 COMPREHEN METABOLIC PANEL: CPT

## 2024-09-12 PROCEDURE — 84443 ASSAY THYROID STIM HORMONE: CPT

## 2024-09-12 PROCEDURE — 80061 LIPID PANEL: CPT

## 2024-09-12 PROCEDURE — 84153 ASSAY OF PSA TOTAL: CPT

## 2024-09-12 PROCEDURE — 83036 HEMOGLOBIN GLYCOSYLATED A1C: CPT

## 2024-09-12 PROCEDURE — 36415 COLL VENOUS BLD VENIPUNCTURE: CPT

## 2024-09-18 ENCOUNTER — OFFICE VISIT (OUTPATIENT)
Dept: FAMILY MEDICINE | Facility: CLINIC | Age: 54
End: 2024-09-18
Payer: COMMERCIAL

## 2024-09-18 ENCOUNTER — PATIENT MESSAGE (OUTPATIENT)
Dept: FAMILY MEDICINE | Facility: CLINIC | Age: 54
End: 2024-09-18

## 2024-09-18 VITALS
SYSTOLIC BLOOD PRESSURE: 122 MMHG | HEIGHT: 67 IN | OXYGEN SATURATION: 97 % | DIASTOLIC BLOOD PRESSURE: 76 MMHG | HEART RATE: 66 BPM | RESPIRATION RATE: 18 BRPM | BODY MASS INDEX: 32.96 KG/M2 | TEMPERATURE: 99 F | WEIGHT: 210 LBS

## 2024-09-18 DIAGNOSIS — R73.03 PREDIABETES: Chronic | ICD-10-CM

## 2024-09-18 DIAGNOSIS — E66.9 CLASS 1 OBESITY WITH SERIOUS COMORBIDITY AND BODY MASS INDEX (BMI) OF 32.0 TO 32.9 IN ADULT, UNSPECIFIED OBESITY TYPE: Chronic | ICD-10-CM

## 2024-09-18 DIAGNOSIS — Z00.00 WELL ADULT HEALTH CHECK: ICD-10-CM

## 2024-09-18 DIAGNOSIS — E79.0 HYPERURICEMIA: Chronic | ICD-10-CM

## 2024-09-18 DIAGNOSIS — I10 PRIMARY HYPERTENSION: Chronic | ICD-10-CM

## 2024-09-18 DIAGNOSIS — Z00.00 WELLNESS EXAMINATION: ICD-10-CM

## 2024-09-18 PROBLEM — R19.5 POSITIVE COLORECTAL CANCER SCREENING USING COLOGUARD TEST: Status: RESOLVED | Noted: 2024-03-13 | Resolved: 2024-09-18

## 2024-09-18 PROCEDURE — 99396 PREV VISIT EST AGE 40-64: CPT | Mod: ,,, | Performed by: STUDENT IN AN ORGANIZED HEALTH CARE EDUCATION/TRAINING PROGRAM

## 2024-09-18 PROCEDURE — 3008F BODY MASS INDEX DOCD: CPT | Mod: CPTII,,, | Performed by: STUDENT IN AN ORGANIZED HEALTH CARE EDUCATION/TRAINING PROGRAM

## 2024-09-18 PROCEDURE — 1160F RVW MEDS BY RX/DR IN RCRD: CPT | Mod: CPTII,,, | Performed by: STUDENT IN AN ORGANIZED HEALTH CARE EDUCATION/TRAINING PROGRAM

## 2024-09-18 PROCEDURE — 3077F SYST BP >= 140 MM HG: CPT | Mod: CPTII,,, | Performed by: STUDENT IN AN ORGANIZED HEALTH CARE EDUCATION/TRAINING PROGRAM

## 2024-09-18 PROCEDURE — 3044F HG A1C LEVEL LT 7.0%: CPT | Mod: CPTII,,, | Performed by: STUDENT IN AN ORGANIZED HEALTH CARE EDUCATION/TRAINING PROGRAM

## 2024-09-18 PROCEDURE — 1159F MED LIST DOCD IN RCRD: CPT | Mod: CPTII,,, | Performed by: STUDENT IN AN ORGANIZED HEALTH CARE EDUCATION/TRAINING PROGRAM

## 2024-09-18 PROCEDURE — 3079F DIAST BP 80-89 MM HG: CPT | Mod: CPTII,,, | Performed by: STUDENT IN AN ORGANIZED HEALTH CARE EDUCATION/TRAINING PROGRAM

## 2024-09-18 PROCEDURE — 4010F ACE/ARB THERAPY RXD/TAKEN: CPT | Mod: CPTII,,, | Performed by: STUDENT IN AN ORGANIZED HEALTH CARE EDUCATION/TRAINING PROGRAM

## 2024-09-18 NOTE — PROGRESS NOTES
Patient ID: [id number]     Chief Complaint: Annual Exam        HPI:      Disclaimer:  This note is prepared using voice recognition software and as such is likely to have errors despite attempts at proofreading. Please contact me for questions.     Javi Rain is a 54 y.o. male here today for an annual wellness.   Reviewed and discussed lab results.   He did not have bloodwork completed prior to visit as ordered but will do so in the near future.   Overall he feels well. No other complaints today.     Acute issues-  Acute issues-  Anemia   Resolved     Chronic issues-  Hypertension  At Goal   On Losartan 25  Asx     Prediabetes   Resolved     Hyperuricemia  On allopurinol 100 daily  Last gout attack was on 2017      Osteoarthritis of left hip  Stable  after loosing weight intentionally      HLD-  The 10-year ASCVD risk score (Jin ALEGRIA, et al., 2019) is: 6.3%    Values used to calculate the score:      Age: 54 years      Sex: Male      Is Non- : No      Diabetic: No      Tobacco smoker: No      Systolic Blood Pressure: 145 mmHg      Is BP treated: Yes      HDL Cholesterol: 66 mg/dL      Total Cholesterol: 216 mg/dL        Chronic issues-    -------------------------------------    Gout    Hyperlipidemia    Hypertension    Hypertension    Hyperuricemia    Obesity    Osteoarthritis of left hip    Prediabetes        History reviewed. No pertinent surgical history.    Review of patient's allergies indicates:  No Known Allergies    Current Outpatient Medications   Medication Instructions    allopurinoL (ZYLOPRIM) 100 mg, Oral, Daily    losartan (COZAAR) 25 mg, Oral, Daily    triamcinolone acetonide 0.1% (KENALOG) 0.1 % cream Topical (Top), 2 times daily       Social History     Socioeconomic History    Marital status:    Tobacco Use    Smoking status: Former     Types: Cigarettes     Passive exposure: Past    Smokeless tobacco: Current     Types: Snuff    Tobacco comments:      Smokless   Substance and Sexual Activity    Alcohol use: Yes     Alcohol/week: 14.0 standard drinks of alcohol     Types: 14 Cans of beer per week    Drug use: Never    Sexual activity: Yes     Partners: Female     Birth control/protection: None     Social Determinants of Health     Financial Resource Strain: Low Risk  (4/12/2023)    Overall Financial Resource Strain (CARDIA)     Difficulty of Paying Living Expenses: Not hard at all   Food Insecurity: No Food Insecurity (4/12/2023)    Hunger Vital Sign     Worried About Running Out of Food in the Last Year: Never true     Ran Out of Food in the Last Year: Never true   Transportation Needs: No Transportation Needs (4/12/2023)    PRAPARE - Transportation     Lack of Transportation (Medical): No     Lack of Transportation (Non-Medical): No   Physical Activity: Insufficiently Active (4/12/2023)    Exercise Vital Sign     Days of Exercise per Week: 3 days     Minutes of Exercise per Session: 30 min   Stress: No Stress Concern Present (4/12/2023)    [employer] - Occupational Stress Questionnaire     Feeling of Stress : Not at all   Housing Stability: Low Risk  (4/12/2023)    Housing Stability Vital Sign     Unable to Pay for Housing in the Last Year: No     Number of Places Lived in the Last Year: 1     Unstable Housing in the Last Year: No        Family History   Problem Relation Name Age of Onset    Arthritis Mother Stephanie Rain     Cancer Mother Stephanie Rain     Heart disease Maternal Grandfather Steve Miladinovich     Diabetes Maternal Grandmother Otis Miladinovich     Diabetes Paternal Grandmother Merced Rain     Heart disease Paternal Grandfather Tank Rain         Patient Care Team:  Erica Adams MD as PCP - General (Family Medicine)     Subjective:     ROS    See HPI for details    Constitutional: Denies Change in appetite. Denies Chills. Denies Fever. Denies Night sweats.  Respiratory: Denies Cough. Denies Shortness of  "breath. Denies Shortness of breath with exertion. Denies Wheezing.  Cardiovascular: DeniesChest pain at rest. Denies Chest pain with exertion. Denies Irregular heartbeat. Denies Palpitations. Denies Edema.  Gastrointestinal: Denies Abdominal pain. DeniesDiarrhea. Denies Nausea. Denies Vomiting. Denies Hematemesis or Hematochezia.  Genitourinary: Denies Dysuria. Denies Urinary frequency. Denies Urinary urgency. Denies Blood in urine.  Psychiatric: Denies Depression. Denies Anxiety. Denies Suicidal/Homicidal ideations.    All Other ROS: Negative except as stated in HPI.       Objective:     Visit Vitals  BP (!) 145/88 (BP Location: Right arm, Patient Position: Sitting, BP Method: Medium (Automatic))   Pulse 66   Temp 98.6 °F (37 °C) (Oral)   Resp 18   Ht 5' 7.32" (1.71 m)   Wt 95.3 kg (210 lb)   SpO2 97%   BMI 32.58 kg/m²       Physical Exam    General: Alert and oriented, Obesity, No acute distress.  Neck/Thyroid: Supple, Non-tender  Respiratory: Clear to auscultation bilaterally  Cardiovascular: Regular rate and rhythm  Gastrointestinal: Soft, Non-tender. No palpable organomegaly.  Musculoskeletal: Normal range of motion.  Neurologic: No focal deficits  Psychiatric: Normal interaction, Coherent speech, Euthymic mood, Appropriate affect     Assessment:       ICD-10-CM ICD-9-CM   1. Wellness examination  Z00.00 V70.0   2. Primary hypertension  I10 401.9   3. Prediabetes  R73.03 790.29   4. Hyperuricemia  E79.0 790.6   5. Class 1 obesity with serious comorbidity and body mass index (BMI) of 32.0 to 32.9 in adult, unspecified obesity type  E66.9 278.00    Z68.32 V85.32   6. Well adult health check  Z00.00 V70.0        Plan:       Health Maintenance Topics with due status: Not Due       Topic Last Completion Date    TETANUS VACCINE 05/23/2021    Colorectal Cancer Screening 05/22/2024    Hemoglobin A1c (Prediabetes) 09/12/2024    Lipid Panel 09/12/2024            Vaccinations -   Immunization History   Administered " Date(s) Administered    Influenza - Quadrivalent - PF *Preferred* (6 months and older) 01/20/2021, 02/03/2022    Influenza - Trivalent - PF (ADULT) 01/20/2021, 02/03/2022    Tdap 01/20/2021, 05/23/2021        1. Wellness examination  AAA Screening -  (65-75)   Lung Cancer-(50-80) Smoker/ Ex smoker- Never Smoked  Prostate Cancer Screening (50-74)- wnl  Colon Cancer Screening(45-75) -colonoscopy by Dr. Cat.  Repeat colonoscopy in 2029  Eye Exam - Recommend annually.   Dental Exam - Recommend biannual exams.     Diet discussed (healthy food choices, reduce portions and overall calorie intake)  Exercise 30-45 minutes 5x per week  Avoid excessive alcohol and tobacco if taking  Immunizations dicussed  Monthly testicular exam recommended  Preventative exams discussed.      2. Primary hypertension  Recommend to continue Rx as prescribed   Keep goal blood pressure less than 130/80  Continue 35 minutes of brisk walking, 5 days in a week  Continue low sodium Diet (DASH Diet - Less than 2 grams of sodium per day).  Recommend to quit smoking if smoking  Maintain healthy weight with goal BMI <25.    3. Prediabetes  Resolved    4. Hyperuricemia  Continue Allopurinol    5. Class 1 obesity with serious comorbidity and body mass index (BMI) of 32.0 to 32.9 in adult, unspecified obesity type  Body mass index is 32.58 kg/m².  Goal BMI <30.  Exercise 5 times a week for 30 minutes per day.  Avoid soda, simple sugars, excessive rice, potatoes or bread. Limit fast foods and fried foods.  Choose complex carbs in moderation (example: green vegetables, beans, oatmeal). Eat plenty of fresh fruits and vegetables with lean meats daily.  Do not skip meals. Eat a balanced portion size.  Avoid fad diets. Consider permanent healthy life style changes.          Medication List with Changes/Refills   Current Medications    ALLOPURINOL (ZYLOPRIM) 100 MG TABLET    Take 1 tablet (100 mg total) by mouth once daily.       Start Date: 5/1/2024  End  Date: --    LOSARTAN (COZAAR) 25 MG TABLET    Take 1 tablet (25 mg total) by mouth once daily.       Start Date: 9/4/2024  End Date: --    TRIAMCINOLONE ACETONIDE 0.1% (KENALOG) 0.1 % CREAM    Apply topically 2 (two) times daily.       Start Date: 7/3/2024  End Date: --          The patient's Health Maintenance was reviewed and the following appears to be due at this time:   Health Maintenance Due   Topic Date Due    Influenza Vaccine (1) 09/01/2024    COVID-19 Vaccine (1 - 2023-24 season) Never done       Follow up in about 2 weeks (around 10/2/2024) for Nurse Visit BP Check. 1AW.   In addition to their scheduled follow up, the patient has also been instructed to follow up on as needed basis.     No future appointments.

## 2024-09-25 ENCOUNTER — CLINICAL SUPPORT (OUTPATIENT)
Dept: FAMILY MEDICINE | Facility: CLINIC | Age: 54
End: 2024-09-25
Payer: COMMERCIAL

## 2024-09-25 VITALS — DIASTOLIC BLOOD PRESSURE: 86 MMHG | SYSTOLIC BLOOD PRESSURE: 122 MMHG

## 2024-09-25 DIAGNOSIS — I10 PRIMARY HYPERTENSION: Primary | Chronic | ICD-10-CM

## 2024-09-25 NOTE — PROGRESS NOTES
Javi Rain 54 y.o. male is here today for Blood Pressure check.   History of HTN yes.    Review of patient's allergies indicates:  No Known Allergies  Creatinine   Date Value Ref Range Status   09/12/2024 0.91 0.73 - 1.18 mg/dL Final     Sodium   Date Value Ref Range Status   09/12/2024 142 136 - 145 mmol/L Final     Potassium   Date Value Ref Range Status   09/12/2024 4.8 3.5 - 5.1 mmol/L Final   ]  Patient verifies taking blood pressure medications on a regular basis at the same time of the day.     Current Outpatient Medications:     allopurinoL (ZYLOPRIM) 100 MG tablet, Take 1 tablet (100 mg total) by mouth once daily., Disp: 30 tablet, Rfl: 3    losartan (COZAAR) 25 MG tablet, Take 1 tablet (25 mg total) by mouth once daily., Disp: 90 tablet, Rfl: 3    triamcinolone acetonide 0.1% (KENALOG) 0.1 % cream, Apply topically 2 (two) times daily. (Patient taking differently: Apply topically 2 (two) times daily.), Disp: 30 g, Rfl: 0  Does patient have record of home blood pressure readings yes. Readings have been averaging normal.   Last dose of blood pressure medication was taken this morning.  Patient is asymptomatic.   Complains of nothing.    BP: 122/86

## 2025-01-29 DIAGNOSIS — E79.0 HYPERURICEMIA: ICD-10-CM

## 2025-01-29 RX ORDER — ALLOPURINOL 100 MG/1
100 TABLET ORAL DAILY
Qty: 30 TABLET | Refills: 3 | Status: SHIPPED | OUTPATIENT
Start: 2025-01-29

## 2025-02-07 ENCOUNTER — OFFICE VISIT (OUTPATIENT)
Dept: URGENT CARE | Facility: CLINIC | Age: 55
End: 2025-02-07
Payer: COMMERCIAL

## 2025-02-07 VITALS
SYSTOLIC BLOOD PRESSURE: 139 MMHG | WEIGHT: 210 LBS | HEART RATE: 71 BPM | HEIGHT: 67 IN | TEMPERATURE: 98 F | RESPIRATION RATE: 17 BRPM | DIASTOLIC BLOOD PRESSURE: 88 MMHG | BODY MASS INDEX: 32.96 KG/M2 | OXYGEN SATURATION: 98 %

## 2025-02-07 DIAGNOSIS — S62.657A CLOSED NONDISPLACED FRACTURE OF MIDDLE PHALANX OF LEFT LITTLE FINGER, INITIAL ENCOUNTER: ICD-10-CM

## 2025-02-07 DIAGNOSIS — M79.645 FINGER PAIN, LEFT: Primary | ICD-10-CM

## 2025-02-07 PROCEDURE — 99202 OFFICE O/P NEW SF 15 MIN: CPT | Mod: ,,, | Performed by: PHYSICIAN ASSISTANT

## 2025-02-07 NOTE — PROGRESS NOTES
"Subjective:      Patient ID: Javi Rain is a 54 y.o. male.    Vitals:  height is 5' 7" (1.702 m) and weight is 95.3 kg (210 lb). His temperature is 98.2 °F (36.8 °C). His blood pressure is 139/88 and his pulse is 71. His respiration is 17 and oxygen saturation is 98%.     Chief Complaint: Injury    Right-hand dominant male reports thrown backyard football on new year's day having left 5th finger mid phalanx pain and swelling persistent over the last 5 weeks presents to urgent care today for initial evaluation.    Injury  This is a chronic problem. The current episode started more than 1 month ago. Associated symptoms include arthralgias and joint swelling. Pertinent negatives include no fever or numbness.     Constitution: Negative for fever.   Musculoskeletal:  Positive for trauma, joint pain and joint swelling.   Skin:  Negative for erythema.   Neurological:  Negative for numbness and tingling.      Objective:     Physical Exam   Constitutional: He is oriented to person, place, and time.  Non-toxic appearance.      Comments:Awake alert pleasant ambulatory male     Cardiovascular: Normal rate and normal pulses.   Musculoskeletal:         General: Swelling present.        Hands:    Neurological: no focal deficit. He is alert and oriented to person, place, and time. No sensory deficit.   Skin: Skin is not diaphoretic. Capillary refill takes less than 2 seconds. No erythema          Previous History      Review of patient's allergies indicates:  No Known Allergies    Past Medical History:   Diagnosis Date    Gout 9/7/2022    Hyperlipidemia 9/7/2022    Hypertension 9/7/2022    Hypertension 9/7/2022    Hyperuricemia 9/7/2022    Obesity 9/7/2022    Osteoarthritis of left hip 9/7/2022    Prediabetes 9/7/2022     Current Outpatient Medications   Medication Instructions    allopurinoL (ZYLOPRIM) 100 mg, Oral, Daily    losartan (COZAAR) 25 mg, Oral, Daily    triamcinolone acetonide 0.1% (KENALOG) 0.1 % cream Topical " "(Top), 2 times daily     History reviewed. No pertinent surgical history.  Family History   Problem Relation Name Age of Onset    Arthritis Mother Stephanie Rain     Cancer Mother Stephanie Rain     Heart disease Maternal Grandfather Steve Miladinovich     Diabetes Maternal Grandmother Otis Miladinovich     Diabetes Paternal Grandmother Merced Rain     Heart disease Paternal Grandfather Tank Rain        Social History     Tobacco Use    Smoking status: Former     Types: Cigarettes     Passive exposure: Past    Smokeless tobacco: Current     Types: Snuff    Tobacco comments:     Smokless   Substance Use Topics    Alcohol use: Yes     Alcohol/week: 14.0 standard drinks of alcohol     Types: 14 Cans of beer per week     Comment: daily    Drug use: Never        Physical Exam      Vital Signs Reviewed   /88   Pulse 71   Temp 98.2 °F (36.8 °C)   Resp 17   Ht 5' 7" (1.702 m)   Wt 95.3 kg (210 lb)   SpO2 98%   BMI 32.89 kg/m²        Procedures    Procedures     Labs     Results for orders placed or performed in visit on 09/12/24   Comprehensive Metabolic Panel    Collection Time: 09/12/24  7:52 AM   Result Value Ref Range    Sodium 142 136 - 145 mmol/L    Potassium 4.8 3.5 - 5.1 mmol/L    Chloride 110 (H) 98 - 107 mmol/L    CO2 26 22 - 29 mmol/L    Glucose 115 (H) 74 - 100 mg/dL    Blood Urea Nitrogen 14.2 8.4 - 25.7 mg/dL    Creatinine 0.91 0.73 - 1.18 mg/dL    Calcium 9.6 8.4 - 10.2 mg/dL    Protein Total 6.7 6.4 - 8.3 gm/dL    Albumin 4.1 3.5 - 5.0 g/dL    Globulin 2.6 2.4 - 3.5 gm/dL    Albumin/Globulin Ratio 1.6 1.1 - 2.0 ratio    Bilirubin Total 0.3 <=1.5 mg/dL    ALP 38 (L) 40 - 150 unit/L    ALT 31 0 - 55 unit/L    AST 28 5 - 34 unit/L    eGFR >60 mL/min/1.73/m2    Anion Gap 6.0 mEq/L    BUN/Creatinine Ratio 16    Lipid Panel    Collection Time: 09/12/24  7:52 AM   Result Value Ref Range    Cholesterol Total 216 (H) <=200 mg/dL    HDL Cholesterol 66 (H) 35 - 60 mg/dL    Triglyceride 69 34 - 140 mg/dL "    Cholesterol/HDL Ratio 3 0 - 5    Very Low Density Lipoprotein 14     LDL Cholesterol 136.00 50.00 - 140.00 mg/dL   TSH    Collection Time: 09/12/24  7:52 AM   Result Value Ref Range    TSH 1.219 0.350 - 4.940 uIU/mL   Hemoglobin A1C    Collection Time: 09/12/24  7:52 AM   Result Value Ref Range    Hemoglobin A1c 5.3 <=7.0 %    Estimated Average Glucose 105.4 mg/dL   CBC with Differential    Collection Time: 09/12/24  7:52 AM   Result Value Ref Range    WBC 7.04 4.50 - 11.50 x10(3)/mcL    RBC 4.46 (L) 4.70 - 6.10 x10(6)/mcL    Hgb 14.5 14.0 - 18.0 g/dL    Hct 43.0 42.0 - 52.0 %    MCV 96.4 (H) 80.0 - 94.0 fL    MCH 32.5 (H) 27.0 - 31.0 pg    MCHC 33.7 33.0 - 36.0 g/dL    RDW 12.8 11.5 - 17.0 %    Platelet 240 130 - 400 x10(3)/mcL    MPV 9.5 7.4 - 10.4 fL    Neut % 63.4 %    Lymph % 22.9 %    Mono % 10.5 %    Eos % 1.6 %    Basophil % 0.9 %    Lymph # 1.61 0.6 - 4.6 x10(3)/mcL    Neut # 4.47 2.1 - 9.2 x10(3)/mcL    Mono # 0.74 0.1 - 1.3 x10(3)/mcL    Eos # 0.11 0 - 0.9 x10(3)/mcL    Baso # 0.06 <=0.2 x10(3)/mcL    Imm Gran # 0.05 (H) 0 - 0.04 x10(3)/mcL    Imm Grans % 0.7 %    NRBC% 0.0 %   PSA, Screening    Collection Time: 09/12/24  7:55 AM   Result Value Ref Range    Prostate Specific Antigen 2.41 <=4.00 ng/mL     XR HAND COMPLETE 3 VIEW LEFT  Order: [id number]  Status: Final result       Visible to patient: Yes (seen)       Next appt: 09/18/2025 at 08:30 AM in Family Medicine (Erica Adams MD)       Dx: Finger pain, left    0 Result Notes  Details    Reading Physician Reading Date Result Priority   Duarte Galloway MD  [phone] 2/7/2025 STAT     Narrative & Impression  EXAMINATION  XR HAND COMPLETE 3 VIEW LEFT     CLINICAL HISTORY  chronic left 5th finger pain and swelling; Pain in left finger(s)     TECHNIQUE  A total of 3 images submitted of the left hand.     COMPARISON  None available at the time of initial interpretation.     FINDINGS  Focal cortical irregularity suggestive of minimally displaced  avulsion injury noted at the 5th digit middle phalanx anterior base.  No other convincing acute or focal osseous abnormality is identified.  There is no dislocation.  No destructive skeletal lesion.     The included soft tissues are without acute abnormality.     IMPRESSION  Findings suggestive of 5th digit middle phalanx base avulsion injury, indeterminate chronicity.        Electronically signed by:Duarte Galloway  Date:                                            02/07/2025  Time:                                           12:57     Assessment:     1. Finger pain, left    2. Closed nondisplaced fracture of middle phalanx of left little finger, initial encounter        Plan:   Patient understands radiologist's x-ray final report concern for middle phalanx avulsion fracture suspected chronic given finger injury 5 weeks ago and discharge plan with finger splint and orthopedic referral in addition to OTC.  Patient verbalized understanding is ready for discharge now    Concern for proximal middle phalanx avulsion fracture.  Recommend finger splint for neutral alignment over the next several weeks.  Recommend follow-up with orthopedist to review finger injury for further evaluation and continued long-term care planning.  May continue alternating Tylenol and ibuprofen every 6-8 hours if needed for pain and inflammation.    Finger pain, left  -     XR HAND COMPLETE 3 VIEW LEFT; Future; Expected date: 02/07/2025  -     Ambulatory referral/consult to Orthopedics    Closed nondisplaced fracture of middle phalanx of left little finger, initial encounter  -     Ambulatory referral/consult to Orthopedics  -     SPLINT FOR HOME USE

## 2025-02-07 NOTE — PATIENT INSTRUCTIONS
Concern for proximal middle phalanx avulsion fracture.  Recommend finger splint for neutral alignment over the next several weeks.  Recommend follow-up with orthopedist to review finger injury for further evaluation and continued long-term care planning.  May continue alternating Tylenol and ibuprofen every 6-8 hours if needed for pain and inflammation.

## 2025-02-17 ENCOUNTER — OFFICE VISIT (OUTPATIENT)
Dept: ORTHOPEDICS | Facility: CLINIC | Age: 55
End: 2025-02-17
Payer: COMMERCIAL

## 2025-02-17 ENCOUNTER — HOSPITAL ENCOUNTER (OUTPATIENT)
Dept: RADIOLOGY | Facility: CLINIC | Age: 55
Discharge: HOME | End: 2025-02-17
Attending: REHABILITATION UNIT
Payer: COMMERCIAL

## 2025-02-17 VITALS
HEART RATE: 81 BPM | BODY MASS INDEX: 31.39 KG/M2 | DIASTOLIC BLOOD PRESSURE: 81 MMHG | WEIGHT: 200 LBS | HEIGHT: 67 IN | SYSTOLIC BLOOD PRESSURE: 148 MMHG

## 2025-02-17 DIAGNOSIS — M79.642 LEFT HAND PAIN: ICD-10-CM

## 2025-02-17 DIAGNOSIS — M79.642 LEFT HAND PAIN: Primary | ICD-10-CM

## 2025-02-17 PROCEDURE — 99203 OFFICE O/P NEW LOW 30 MIN: CPT | Mod: ,,, | Performed by: REHABILITATION UNIT

## 2025-02-17 NOTE — PROGRESS NOTES
Subjective:      Patient ID: Javi Rain is a 54 y.o. male.    Chief Complaint: Follow-up of the Left Hand (F/u Left Hand Closed Nondisplaced Fx Middle Phalanx Lt Little Finger patient states he was throwing the football on New Years and he jammed his little and he went to urgent care in [facility]. He did wear a finger splint for while but it was uncomfortable. He does still have some swelling and he can't completely bend because of the swelling but he can move it and use it. Denies any pain unless he bumps finger or types a lot at work.  )    HPI:   Javi Rain is a 54 y.o. male who presents today for initial evaluation of his left small finger    History of Present Illness    CHIEF COMPLAINT:  - Left hand injury      HPI:  Javi presents with a left hand injury sustained while throwing a football on New Year's Day 2025. Five weeks post-injury, due to persistent swelling, he visited a walk-in clinic and subsequently an orthopedic specialist who provided a long splint. Finding this uncomfortable, he purchased a smaller splint but forgot to wear it to this appointment.    His current symptoms include stiffness in the affected finger, particularly when making a fist. He experiences discomfort when the finger is fully flexed, with some residual swelling and discoloration around the knuckle.    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 He has been trying to limit the use of the injured finger as per previous medical advice but finds it challenging to avoid using it completely. He reports a few instances where his dog jumped and impacted the finger, causing noticeable discomfort. He has been using an ice pack on the affected area during his lunch breaks at work.    X-rays taken on February 7th revealed a small injury at the base of the middle phalanx of the left hand. Javi has been wearing the splint inconsistently, removing it for yard work over the  "weekend.    Javi denies any formal medical diagnoses related to this injury.    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 No pertinent surgical history.  Social History[1]    Current Medications[2]  Review of patient's allergies indicates:  No Known Allergies    BP (!) 148/81 (BP Location: Right arm, Patient Position: Sitting) Comment: had lots of coffee  Pulse 81   Ht 5' 7" (1.702 m)   Wt 90.7 kg (200 lb)   BMI 31.32 kg/m²     Comprehensive review of systems completed and negative except as per HPI.        Objective:   Head: Normocephalic, without obvious abnormality, atraumatic  Eyes: conjunctivae/corneas clear. EOM's intact  Ears: normal external appearance  Nose: Nares normal. Septum midline. Mucosa normal. No drainage  Throat: normal findings: lips normal without lesions  Lungs: unlabored breathing on room air  Chest wall: symmetric chest rise  Heart: regular rate and rhythm  Pulses: 2+ and symmetric  Skin: Skin color, texture, turgor normal. No rashes or lesions  Neurologic: Grossly normal    LUE    Appearance:   Skin intact. Mild soft tissue swelling about small finger    Tenderness:   Small " finger pip     ROM:   Full extension. Lacks a few degrees of terminal flexion at the ips     Pulses: Palpable radial pulse    Neurological deficits: None    The patient has a warm and well-perfused upper extremity with capillary refill less than 2 seconds. Sensation is intact to light touch in terminal nerve distributions. 5/5 ain/pin/uln. The patient has no palpable epitrochlear lymphadenopathy.      Assessment:     Imaging:   - X-rays of the left hand : February 7th, 2025, Joint spaces look good overall, Small injury at the base of the middle phalanx visible on the lateral view. Joint spaces lined up well        1. Left hand pain          Plan:             Imaging and exam findings discussed. Injury can be managed nonoperatively.     Assessment & Plan    PROCEDURES:  - Reviewed x-ray images from February 7th showing injury at the base of the middle phalanx of the left hand little finger.    PATIENT INSTRUCTIONS:  - Use ice on the affected area.  - Increase motion and use of the injured finger.  - Discontinue use of splint.     He declined formal PT at this time. He will resume activities on his own. Will call if he wants to start formal PT.     Progress back to activities as able.     F/u PRN    All questions were answered. Patient happy and in agreement with the plan.     This note was generated with the assistance of ambient listening technology. Verbal consent was obtained by the patient and accompanying visitor(s) for the recording of patient appointment to facilitate this note. I attest to having reviewed and edited the generated note for accuracy, though some syntax or spelling errors may persist. Please contact the author of this note for any clarification.              [1]   Social History  Socioeconomic History    Marital status:    Tobacco Use    Smoking status: Former     Types: Cigarettes     Passive exposure: Past    Smokeless tobacco: Current     Types: Snuff    Tobacco comments:     Smokless    Substance and Sexual Activity    Alcohol use: Yes     Alcohol/week: 14.0 standard drinks of alcohol     Types: 14 Cans of beer per week     Comment: daily    Drug use: Never    Sexual activity: Yes     Partners: Female     Birth control/protection: None     Social Drivers of Health     Financial Resource Strain: Low Risk  (9/21/2024)    Overall Financial Resource Strain (CARDIA)     Difficulty of Paying Living Expenses: Not hard at all   Food Insecurity: No Food Insecurity (9/21/2024)    Hunger Vital Sign     Worried About Running Out of Food in the Last Year: Never true     Ran Out of Food in the Last Year: Never true   Transportation Needs: No Transportation Needs (4/12/2023)    PRAPARE - Transportation     Lack of Transportation (Medical): No     Lack of Transportation (Non-Medical): No   Physical Activity: Sufficiently Active (9/21/2024)    Exercise Vital Sign     Days of Exercise per Week: 5 days     Minutes of Exercise per Session: 60 min   Stress: No Stress Concern Present (9/21/2024)    [employer] - Occupational Stress Questionnaire     Feeling of Stress : Not at all   Housing Stability: Low Risk  (4/12/2023)    Housing Stability Vital Sign     Unable to Pay for Housing in the Last Year: No     Number of Places Lived in the Last Year: 1     Unstable Housing in the Last Year: No   [2]   Current Outpatient Medications:     allopurinoL (ZYLOPRIM) 100 MG tablet, Take 1 tablet (100 mg total) by mouth once daily., Disp: 30 tablet, Rfl: 3    losartan (COZAAR) 25 MG tablet, Take 1 tablet (25 mg total) by mouth once daily., Disp: 90 tablet, Rfl: 3    triamcinolone acetonide 0.1% (KENALOG) 0.1 % cream, Apply topically 2 (two) times daily., Disp: 30 g, Rfl: 0

## 2025-06-04 DIAGNOSIS — E79.0 HYPERURICEMIA: ICD-10-CM

## 2025-06-04 RX ORDER — ALLOPURINOL 100 MG/1
100 TABLET ORAL DAILY
Qty: 30 TABLET | Refills: 3 | Status: SHIPPED | OUTPATIENT
Start: 2025-06-04

## 2025-08-22 ENCOUNTER — LAB VISIT (OUTPATIENT)
Dept: LAB | Facility: HOSPITAL | Age: 55
End: 2025-08-22
Attending: STUDENT IN AN ORGANIZED HEALTH CARE EDUCATION/TRAINING PROGRAM
Payer: COMMERCIAL

## 2025-08-22 DIAGNOSIS — Z00.00 WELL ADULT HEALTH CHECK: ICD-10-CM

## 2025-08-22 LAB
25(OH)D3+25(OH)D2 SERPL-MCNC: 50 NG/ML (ref 30–80)
ALBUMIN SERPL-MCNC: 3.7 G/DL (ref 3.5–5)
ALBUMIN/CREAT UR: NORMAL
ALBUMIN/GLOB SERPL: 1.4 RATIO (ref 1.1–2)
ALP SERPL-CCNC: 39 UNIT/L (ref 40–150)
ALT SERPL-CCNC: 27 UNIT/L (ref 0–55)
ANION GAP SERPL CALC-SCNC: 5 MEQ/L
AST SERPL-CCNC: 31 UNIT/L (ref 11–45)
BASOPHILS # BLD AUTO: 0.04 X10(3)/MCL
BASOPHILS NFR BLD AUTO: 0.6 %
BILIRUB SERPL-MCNC: 0.4 MG/DL
BUN SERPL-MCNC: 12.5 MG/DL (ref 8.4–25.7)
CALCIUM SERPL-MCNC: 8.7 MG/DL (ref 8.4–10.2)
CHLORIDE SERPL-SCNC: 107 MMOL/L (ref 98–107)
CHOLEST SERPL-MCNC: 206 MG/DL
CHOLEST/HDLC SERPL: 3 {RATIO} (ref 0–5)
CO2 SERPL-SCNC: 28 MMOL/L (ref 22–29)
CREAT SERPL-MCNC: 0.93 MG/DL (ref 0.72–1.25)
CREAT UR-MCNC: 79.5 MG/DL (ref 63–166)
CREAT/UREA NIT SERPL: 13
EOSINOPHIL # BLD AUTO: 0.2 X10(3)/MCL (ref 0–0.9)
EOSINOPHIL NFR BLD AUTO: 3 %
ERYTHROCYTE [DISTWIDTH] IN BLOOD BY AUTOMATED COUNT: 13.7 % (ref 11.5–17)
EST. AVERAGE GLUCOSE BLD GHB EST-MCNC: 99.7 MG/DL
GFR SERPLBLD CREATININE-BSD FMLA CKD-EPI: >60 ML/MIN/1.73/M2
GLOBULIN SER-MCNC: 2.6 GM/DL (ref 2.4–3.5)
GLUCOSE SERPL-MCNC: 124 MG/DL (ref 74–100)
HBA1C MFR BLD: 5.1 %
HCT VFR BLD AUTO: 40.7 % (ref 42–52)
HDLC SERPL-MCNC: 64 MG/DL (ref 35–60)
HGB BLD-MCNC: 13.2 G/DL (ref 14–18)
IMM GRANULOCYTES # BLD AUTO: 0.04 X10(3)/MCL (ref 0–0.04)
IMM GRANULOCYTES NFR BLD AUTO: 0.6 %
LDLC SERPL CALC-MCNC: 131 MG/DL (ref 50–140)
LYMPHOCYTES # BLD AUTO: 1.77 X10(3)/MCL (ref 0.6–4.6)
LYMPHOCYTES NFR BLD AUTO: 26.6 %
MCH RBC QN AUTO: 32.5 PG (ref 27–31)
MCHC RBC AUTO-ENTMCNC: 32.4 G/DL (ref 33–36)
MCV RBC AUTO: 100.2 FL (ref 80–94)
MICROALBUMIN UR-MCNC: <5 UG/ML
MONOCYTES # BLD AUTO: 0.72 X10(3)/MCL (ref 0.1–1.3)
MONOCYTES NFR BLD AUTO: 10.8 %
NEUTROPHILS # BLD AUTO: 3.88 X10(3)/MCL (ref 2.1–9.2)
NEUTROPHILS NFR BLD AUTO: 58.4 %
NRBC BLD AUTO-RTO: 0 %
PLATELET # BLD AUTO: 208 X10(3)/MCL (ref 130–400)
PMV BLD AUTO: 9 FL (ref 7.4–10.4)
POTASSIUM SERPL-SCNC: 4.3 MMOL/L (ref 3.5–5.1)
PROT SERPL-MCNC: 6.3 GM/DL (ref 6.4–8.3)
PSA SERPL-MCNC: 2.19 NG/ML
RBC # BLD AUTO: 4.06 X10(6)/MCL (ref 4.7–6.1)
SODIUM SERPL-SCNC: 140 MMOL/L (ref 136–145)
TRIGL SERPL-MCNC: 55 MG/DL (ref 34–140)
TSH SERPL-ACNC: 1.26 UIU/ML (ref 0.35–4.94)
URATE SERPL-MCNC: 5.6 MG/DL (ref 3.5–7.2)
VLDLC SERPL CALC-MCNC: 11 MG/DL
WBC # BLD AUTO: 6.65 X10(3)/MCL (ref 4.5–11.5)

## 2025-08-22 PROCEDURE — 84153 ASSAY OF PSA TOTAL: CPT

## 2025-08-22 PROCEDURE — 82306 VITAMIN D 25 HYDROXY: CPT

## 2025-08-22 PROCEDURE — 85025 COMPLETE CBC W/AUTO DIFF WBC: CPT

## 2025-08-22 PROCEDURE — 82570 ASSAY OF URINE CREATININE: CPT

## 2025-08-22 PROCEDURE — 36415 COLL VENOUS BLD VENIPUNCTURE: CPT

## 2025-08-22 PROCEDURE — 84550 ASSAY OF BLOOD/URIC ACID: CPT

## 2025-08-22 PROCEDURE — 80053 COMPREHEN METABOLIC PANEL: CPT

## 2025-08-22 PROCEDURE — 84443 ASSAY THYROID STIM HORMONE: CPT

## 2025-08-22 PROCEDURE — 80061 LIPID PANEL: CPT

## 2025-08-22 PROCEDURE — 83036 HEMOGLOBIN GLYCOSYLATED A1C: CPT
